# Patient Record
Sex: FEMALE | Race: ASIAN | Employment: OTHER | URBAN - METROPOLITAN AREA
[De-identification: names, ages, dates, MRNs, and addresses within clinical notes are randomized per-mention and may not be internally consistent; named-entity substitution may affect disease eponyms.]

---

## 2024-05-24 PROBLEM — H43.399 VITREOUS FLOATERS: Status: ACTIVE | Noted: 2024-05-24

## 2024-05-31 ENCOUNTER — HOSPITAL ENCOUNTER (OUTPATIENT)
Dept: ULTRASOUND IMAGING | Age: 79
Discharge: HOME | End: 2024-05-31
Attending: FAMILY MEDICINE
Payer: MEDICARE

## 2024-05-31 ENCOUNTER — HOSPITAL ENCOUNTER (OUTPATIENT)
Dept: WOMENS IMAGING | Age: 79
Discharge: HOME | End: 2024-05-31
Payer: MEDICARE

## 2024-05-31 VITALS — HEIGHT: 57 IN | BODY MASS INDEX: 29.99 KG/M2 | WEIGHT: 139 LBS

## 2024-05-31 DIAGNOSIS — Z12.31 VISIT FOR SCREENING MAMMOGRAM: ICD-10-CM

## 2024-05-31 DIAGNOSIS — E03.9 HYPOTHYROIDISM IN ADULT: ICD-10-CM

## 2024-05-31 DIAGNOSIS — R22.1 LUMP IN NECK: ICD-10-CM

## 2024-05-31 PROCEDURE — 77063 BREAST TOMOSYNTHESIS BI: CPT

## 2024-05-31 PROCEDURE — 76536 US EXAM OF HEAD AND NECK: CPT

## 2024-06-05 ENCOUNTER — APPOINTMENT (OUTPATIENT)
Dept: GENERAL RADIOLOGY | Age: 79
End: 2024-06-05
Payer: MEDICARE

## 2024-06-05 ENCOUNTER — APPOINTMENT (OUTPATIENT)
Dept: CT IMAGING | Age: 79
End: 2024-06-05
Payer: MEDICARE

## 2024-06-05 ENCOUNTER — TELEPHONE (OUTPATIENT)
Dept: CARDIOLOGY CLINIC | Age: 79
End: 2024-06-05

## 2024-06-05 ENCOUNTER — HOSPITAL ENCOUNTER (INPATIENT)
Age: 79
LOS: 2 days | Discharge: HOME | End: 2024-06-07
Attending: EMERGENCY MEDICINE | Admitting: INTERNAL MEDICINE
Payer: MEDICARE

## 2024-06-05 DIAGNOSIS — I20.9 ANGINA PECTORIS (HCC): ICD-10-CM

## 2024-06-05 DIAGNOSIS — R07.9 CHEST PAIN, UNSPECIFIED TYPE: Primary | ICD-10-CM

## 2024-06-05 DIAGNOSIS — R91.1 PULMONARY NODULE: ICD-10-CM

## 2024-06-05 PROBLEM — I16.0 HYPERTENSIVE URGENCY: Status: ACTIVE | Noted: 2024-06-05

## 2024-06-05 PROBLEM — E11.9 DM2 (DIABETES MELLITUS, TYPE 2) (HCC): Status: ACTIVE | Noted: 2024-06-05

## 2024-06-05 LAB
ALBUMIN SERPL-MCNC: 4.6 G/DL (ref 3.4–5)
ALBUMIN/GLOB SERPL: 1.5 {RATIO} (ref 1.1–2.2)
ALP SERPL-CCNC: 128 U/L (ref 40–129)
ALT SERPL-CCNC: 33 U/L (ref 10–40)
ANION GAP SERPL CALCULATED.3IONS-SCNC: 11 MMOL/L (ref 3–16)
AST SERPL-CCNC: 23 U/L (ref 15–37)
BASOPHILS # BLD: 0.1 K/UL (ref 0–0.2)
BASOPHILS NFR BLD: 1.2 %
BILIRUB SERPL-MCNC: 0.6 MG/DL (ref 0–1)
BUN SERPL-MCNC: 16 MG/DL (ref 7–20)
CALCIUM SERPL-MCNC: 9.4 MG/DL (ref 8.3–10.6)
CHLORIDE SERPL-SCNC: 101 MMOL/L (ref 99–110)
CO2 SERPL-SCNC: 26 MMOL/L (ref 21–32)
CREAT SERPL-MCNC: 0.8 MG/DL (ref 0.6–1.2)
D-DIMER QUANTITATIVE: 0.83 UG/ML FEU (ref 0–0.6)
DEPRECATED RDW RBC AUTO: 13.9 % (ref 12.4–15.4)
EOSINOPHIL # BLD: 0.1 K/UL (ref 0–0.6)
EOSINOPHIL NFR BLD: 2.3 %
GFR SERPLBLD CREATININE-BSD FMLA CKD-EPI: 76 ML/MIN/{1.73_M2}
GLUCOSE SERPL-MCNC: 93 MG/DL (ref 70–99)
HCT VFR BLD AUTO: 41.3 % (ref 36–48)
HGB BLD-MCNC: 14.2 G/DL (ref 12–16)
LYMPHOCYTES # BLD: 1.9 K/UL (ref 1–5.1)
LYMPHOCYTES NFR BLD: 39 %
MCH RBC QN AUTO: 31.9 PG (ref 26–34)
MCHC RBC AUTO-ENTMCNC: 34.4 G/DL (ref 31–36)
MCV RBC AUTO: 92.8 FL (ref 80–100)
MONOCYTES # BLD: 0.5 K/UL (ref 0–1.3)
MONOCYTES NFR BLD: 10.3 %
NEUTROPHILS # BLD: 2.3 K/UL (ref 1.7–7.7)
NEUTROPHILS NFR BLD: 47.2 %
NT-PROBNP SERPL-MCNC: 97 PG/ML (ref 0–449)
PLATELET # BLD AUTO: 184 K/UL (ref 135–450)
PMV BLD AUTO: 8.8 FL (ref 5–10.5)
POTASSIUM SERPL-SCNC: 3.9 MMOL/L (ref 3.5–5.1)
PROT SERPL-MCNC: 7.6 G/DL (ref 6.4–8.2)
RBC # BLD AUTO: 4.45 M/UL (ref 4–5.2)
SODIUM SERPL-SCNC: 138 MMOL/L (ref 136–145)
TROPONIN, HIGH SENSITIVITY: <6 NG/L (ref 0–14)
TROPONIN, HIGH SENSITIVITY: <6 NG/L (ref 0–14)
WBC # BLD AUTO: 4.8 K/UL (ref 4–11)

## 2024-06-05 PROCEDURE — 83880 ASSAY OF NATRIURETIC PEPTIDE: CPT

## 2024-06-05 PROCEDURE — 6370000000 HC RX 637 (ALT 250 FOR IP): Performed by: NURSE PRACTITIONER

## 2024-06-05 PROCEDURE — 6360000004 HC RX CONTRAST MEDICATION: Performed by: EMERGENCY MEDICINE

## 2024-06-05 PROCEDURE — 71250 CT THORAX DX C-: CPT

## 2024-06-05 PROCEDURE — 70450 CT HEAD/BRAIN W/O DYE: CPT

## 2024-06-05 PROCEDURE — 2580000003 HC RX 258: Performed by: INTERNAL MEDICINE

## 2024-06-05 PROCEDURE — 99285 EMERGENCY DEPT VISIT HI MDM: CPT

## 2024-06-05 PROCEDURE — 1200000000 HC SEMI PRIVATE

## 2024-06-05 PROCEDURE — 80053 COMPREHEN METABOLIC PANEL: CPT

## 2024-06-05 PROCEDURE — 36415 COLL VENOUS BLD VENIPUNCTURE: CPT

## 2024-06-05 PROCEDURE — 84484 ASSAY OF TROPONIN QUANT: CPT

## 2024-06-05 PROCEDURE — 85025 COMPLETE CBC W/AUTO DIFF WBC: CPT

## 2024-06-05 PROCEDURE — 85379 FIBRIN DEGRADATION QUANT: CPT

## 2024-06-05 PROCEDURE — 71045 X-RAY EXAM CHEST 1 VIEW: CPT

## 2024-06-05 PROCEDURE — 6360000002 HC RX W HCPCS: Performed by: INTERNAL MEDICINE

## 2024-06-05 PROCEDURE — 93005 ELECTROCARDIOGRAM TRACING: CPT | Performed by: EMERGENCY MEDICINE

## 2024-06-05 PROCEDURE — 6370000000 HC RX 637 (ALT 250 FOR IP): Performed by: INTERNAL MEDICINE

## 2024-06-05 PROCEDURE — 71275 CT ANGIOGRAPHY CHEST: CPT

## 2024-06-05 RX ORDER — SODIUM CHLORIDE 9 MG/ML
INJECTION, SOLUTION INTRAVENOUS PRN
Status: DISCONTINUED | OUTPATIENT
Start: 2024-06-05 | End: 2024-06-07 | Stop reason: HOSPADM

## 2024-06-05 RX ORDER — LEVOTHYROXINE SODIUM 0.07 MG/1
75 TABLET ORAL
Status: DISCONTINUED | OUTPATIENT
Start: 2024-06-06 | End: 2024-06-07 | Stop reason: HOSPADM

## 2024-06-05 RX ORDER — SODIUM CHLORIDE 0.9 % (FLUSH) 0.9 %
10 SYRINGE (ML) INJECTION PRN
Status: DISCONTINUED | OUTPATIENT
Start: 2024-06-05 | End: 2024-06-07 | Stop reason: HOSPADM

## 2024-06-05 RX ORDER — SODIUM CHLORIDE 0.9 % (FLUSH) 0.9 %
5-40 SYRINGE (ML) INJECTION EVERY 12 HOURS SCHEDULED
Status: DISCONTINUED | OUTPATIENT
Start: 2024-06-05 | End: 2024-06-07 | Stop reason: HOSPADM

## 2024-06-05 RX ORDER — HYDRALAZINE HYDROCHLORIDE 20 MG/ML
10 INJECTION INTRAMUSCULAR; INTRAVENOUS EVERY 6 HOURS PRN
Status: DISCONTINUED | OUTPATIENT
Start: 2024-06-05 | End: 2024-06-07 | Stop reason: HOSPADM

## 2024-06-05 RX ORDER — ONDANSETRON 2 MG/ML
4 INJECTION INTRAMUSCULAR; INTRAVENOUS EVERY 6 HOURS PRN
Status: DISCONTINUED | OUTPATIENT
Start: 2024-06-05 | End: 2024-06-07 | Stop reason: HOSPADM

## 2024-06-05 RX ORDER — NITROGLYCERIN 0.4 MG/1
0.4 TABLET SUBLINGUAL ONCE
Status: COMPLETED | OUTPATIENT
Start: 2024-06-05 | End: 2024-06-05

## 2024-06-05 RX ORDER — ATORVASTATIN CALCIUM 40 MG/1
40 TABLET, FILM COATED ORAL NIGHTLY
Status: DISCONTINUED | OUTPATIENT
Start: 2024-06-05 | End: 2024-06-05 | Stop reason: SDUPTHER

## 2024-06-05 RX ORDER — LOSARTAN POTASSIUM 100 MG/1
50 TABLET ORAL EVERY MORNING
Status: DISCONTINUED | OUTPATIENT
Start: 2024-06-06 | End: 2024-06-05

## 2024-06-05 RX ORDER — NITROGLYCERIN 0.4 MG/1
0.4 TABLET SUBLINGUAL EVERY 5 MIN PRN
Status: DISCONTINUED | OUTPATIENT
Start: 2024-06-05 | End: 2024-06-07 | Stop reason: HOSPADM

## 2024-06-05 RX ORDER — POTASSIUM CHLORIDE 7.45 MG/ML
10 INJECTION INTRAVENOUS PRN
Status: DISCONTINUED | OUTPATIENT
Start: 2024-06-05 | End: 2024-06-07 | Stop reason: HOSPADM

## 2024-06-05 RX ORDER — ATORVASTATIN CALCIUM 10 MG/1
10 TABLET, FILM COATED ORAL NIGHTLY
Status: DISCONTINUED | OUTPATIENT
Start: 2024-06-05 | End: 2024-06-07 | Stop reason: HOSPADM

## 2024-06-05 RX ORDER — METOPROLOL SUCCINATE 50 MG/1
100 TABLET, EXTENDED RELEASE ORAL DAILY
Status: DISCONTINUED | OUTPATIENT
Start: 2024-06-06 | End: 2024-06-05

## 2024-06-05 RX ORDER — HYDROCODONE BITARTRATE AND ACETAMINOPHEN 7.5; 325 MG/1; MG/1
1 TABLET ORAL EVERY 6 HOURS PRN
Status: DISCONTINUED | OUTPATIENT
Start: 2024-06-05 | End: 2024-06-07 | Stop reason: HOSPADM

## 2024-06-05 RX ORDER — METOPROLOL SUCCINATE 50 MG/1
100 TABLET, EXTENDED RELEASE ORAL EVERY EVENING
Status: DISCONTINUED | OUTPATIENT
Start: 2024-06-06 | End: 2024-06-07 | Stop reason: HOSPADM

## 2024-06-05 RX ORDER — ESTRADIOL 0.1 MG/G
2 CREAM VAGINAL NIGHTLY
Status: DISCONTINUED | OUTPATIENT
Start: 2024-06-05 | End: 2024-06-05

## 2024-06-05 RX ORDER — POTASSIUM CHLORIDE 20 MEQ/1
40 TABLET, EXTENDED RELEASE ORAL PRN
Status: DISCONTINUED | OUTPATIENT
Start: 2024-06-05 | End: 2024-06-05 | Stop reason: SDUPTHER

## 2024-06-05 RX ORDER — PANTOPRAZOLE SODIUM 40 MG/1
40 TABLET, DELAYED RELEASE ORAL DAILY
Status: DISCONTINUED | OUTPATIENT
Start: 2024-06-06 | End: 2024-06-07 | Stop reason: HOSPADM

## 2024-06-05 RX ORDER — POTASSIUM CHLORIDE 20 MEQ/1
40 TABLET, EXTENDED RELEASE ORAL PRN
Status: DISCONTINUED | OUTPATIENT
Start: 2024-06-05 | End: 2024-06-07 | Stop reason: HOSPADM

## 2024-06-05 RX ORDER — POTASSIUM CHLORIDE 7.45 MG/ML
10 INJECTION INTRAVENOUS PRN
Status: DISCONTINUED | OUTPATIENT
Start: 2024-06-05 | End: 2024-06-05 | Stop reason: SDUPTHER

## 2024-06-05 RX ORDER — ONDANSETRON 4 MG/1
4 TABLET, ORALLY DISINTEGRATING ORAL EVERY 8 HOURS PRN
Status: DISCONTINUED | OUTPATIENT
Start: 2024-06-05 | End: 2024-06-07 | Stop reason: HOSPADM

## 2024-06-05 RX ORDER — 0.9 % SODIUM CHLORIDE 0.9 %
500 INTRAVENOUS SOLUTION INTRAVENOUS PRN
Status: DISCONTINUED | OUTPATIENT
Start: 2024-06-05 | End: 2024-06-07 | Stop reason: HOSPADM

## 2024-06-05 RX ORDER — ACETAMINOPHEN 650 MG/1
650 SUPPOSITORY RECTAL EVERY 6 HOURS PRN
Status: DISCONTINUED | OUTPATIENT
Start: 2024-06-05 | End: 2024-06-07 | Stop reason: HOSPADM

## 2024-06-05 RX ORDER — ASPIRIN 81 MG/1
324 TABLET, CHEWABLE ORAL ONCE
Status: COMPLETED | OUTPATIENT
Start: 2024-06-05 | End: 2024-06-05

## 2024-06-05 RX ORDER — METFORMIN HYDROCHLORIDE 500 MG/1
500 TABLET, EXTENDED RELEASE ORAL
Status: DISCONTINUED | OUTPATIENT
Start: 2024-06-06 | End: 2024-06-07 | Stop reason: HOSPADM

## 2024-06-05 RX ORDER — ASPIRIN 81 MG/1
81 TABLET, CHEWABLE ORAL DAILY
Status: DISCONTINUED | OUTPATIENT
Start: 2024-06-06 | End: 2024-06-06

## 2024-06-05 RX ORDER — LOSARTAN POTASSIUM 100 MG/1
50 TABLET ORAL NIGHTLY
Status: DISCONTINUED | OUTPATIENT
Start: 2024-06-06 | End: 2024-06-06

## 2024-06-05 RX ORDER — ACETAMINOPHEN 325 MG/1
650 TABLET ORAL EVERY 6 HOURS PRN
Status: DISCONTINUED | OUTPATIENT
Start: 2024-06-05 | End: 2024-06-07 | Stop reason: HOSPADM

## 2024-06-05 RX ADMIN — ASPIRIN 324 MG: 81 TABLET, CHEWABLE ORAL at 21:37

## 2024-06-05 RX ADMIN — METOPROLOL SUCCINATE 100 MG: 50 TABLET, EXTENDED RELEASE ORAL at 23:49

## 2024-06-05 RX ADMIN — HYDROCODONE BITARTRATE AND ACETAMINOPHEN 1 TABLET: 7.5; 325 TABLET ORAL at 23:17

## 2024-06-05 RX ADMIN — NITROGLYCERIN 0.4 MG: 0.4 TABLET SUBLINGUAL at 21:37

## 2024-06-05 RX ADMIN — SODIUM CHLORIDE, PRESERVATIVE FREE 10 ML: 5 INJECTION INTRAVENOUS at 23:49

## 2024-06-05 RX ADMIN — IOPAMIDOL 75 ML: 755 INJECTION, SOLUTION INTRAVENOUS at 19:15

## 2024-06-05 RX ADMIN — LOSARTAN POTASSIUM 50 MG: 100 TABLET, FILM COATED ORAL at 23:49

## 2024-06-05 RX ADMIN — ATORVASTATIN CALCIUM 10 MG: 10 TABLET, FILM COATED ORAL at 23:49

## 2024-06-05 RX ADMIN — HYDRALAZINE HYDROCHLORIDE 10 MG: 20 INJECTION INTRAMUSCULAR; INTRAVENOUS at 23:17

## 2024-06-05 ASSESSMENT — ENCOUNTER SYMPTOMS
SHORTNESS OF BREATH: 0
ABDOMINAL PAIN: 0
BACK PAIN: 1
VOMITING: 0
DIARRHEA: 0
CHEST TIGHTNESS: 0
NAUSEA: 0

## 2024-06-05 ASSESSMENT — LIFESTYLE VARIABLES
HOW MANY STANDARD DRINKS CONTAINING ALCOHOL DO YOU HAVE ON A TYPICAL DAY: PATIENT DOES NOT DRINK
HOW OFTEN DO YOU HAVE A DRINK CONTAINING ALCOHOL: NEVER

## 2024-06-05 ASSESSMENT — PAIN DESCRIPTION - DESCRIPTORS: DESCRIPTORS: ACHING;THROBBING

## 2024-06-05 ASSESSMENT — PAIN SCALES - GENERAL
PAINLEVEL_OUTOF10: 8
PAINLEVEL_OUTOF10: 5

## 2024-06-05 ASSESSMENT — PAIN DESCRIPTION - PAIN TYPE: TYPE: ACUTE PAIN

## 2024-06-05 ASSESSMENT — PAIN DESCRIPTION - FREQUENCY: FREQUENCY: CONTINUOUS

## 2024-06-05 ASSESSMENT — PAIN DESCRIPTION - ONSET: ONSET: ON-GOING

## 2024-06-05 ASSESSMENT — PAIN DESCRIPTION - ORIENTATION: ORIENTATION: MID;LEFT;RIGHT

## 2024-06-05 ASSESSMENT — HEART SCORE: ECG: NORMAL

## 2024-06-05 ASSESSMENT — PAIN DESCRIPTION - LOCATION: LOCATION: HEAD

## 2024-06-05 NOTE — ED PROVIDER NOTES
3    Associated Diagnoses: Essential hypertension, benign      triamcinolone (KENALOG) 0.1 % cream Apply topically 2 times daily.  Qty: 15 g, Refills: 0    Associated Diagnoses: Seborrheic keratoses, inflamed      metFORMIN (GLUCOPHAGE-XR) 500 MG extended release tablet Take 1 tablet by mouth daily (with breakfast)  Qty: 90 tablet, Refills: 3    Associated Diagnoses: Diabetes mellitus, new onset (HCC)      apixaban (ELIQUIS) 5 MG TABS tablet Take 1 tablet by mouth 2 times daily  Qty: 28 tablet, Refills: 0    Comments: 2 boxes provided   Lot: [id number]  Exp:08/25 5/8/24 AD  Associated Diagnoses: Atrial fibrillation, unspecified type (HCC)      atorvastatin (LIPITOR) 10 MG tablet Take 1 tablet by mouth nightly  Qty: 90 tablet, Refills: 3    Associated Diagnoses: Mixed hyperlipidemia      levothyroxine (SYNTHROID) 75 MCG tablet Take 1 tablet by mouth daily  Qty: 90 tablet, Refills: 3    Associated Diagnoses: Hypothyroidism in adult      pantoprazole (PROTONIX) 40 MG tablet Take 1 tablet by mouth daily  Qty: 90 tablet, Refills: 3    Associated Diagnoses: GERD without esophagitis      metoprolol succinate (TOPROL XL) 100 MG extended release tablet Take 1 tablet by mouth daily  Qty: 90 tablet, Refills: 3      nystatin (MYCOSTATIN) 142781 UNIT/GM ointment Apply topically 2 times daily.  Qty: 30 g, Refills: 0    Associated Diagnoses: Yeast infection of the vagina      estradiol (ESTRACE) 0.1 MG/GM vaginal cream              ALLERGIES     Macrobid [nitrofurantoin] and Penicillins    FAMILYHISTORY       Family History   Problem Relation Age of Onset    High Blood Pressure Father         SOCIAL HISTORY       Social History     Tobacco Use    Smoking status: Never    Smokeless tobacco: Never    Tobacco comments:     no history   Vaping Use    Vaping Use: Never used   Substance Use Topics    Alcohol use: No    Drug use: No       SCREENINGS        Suzie Coma Scale  Eye Opening: Spontaneous  Best Verbal Response: Oriented  Best  release tablet 100 mg (100 mg Oral Given 6/5/24 2349)   iopamidol (ISOVUE-370) 76 % injection 75 mL (75 mLs IntraVENous Given 6/5/24 1915)   aspirin chewable tablet 324 mg (324 mg Oral Given 6/5/24 2137)   nitroGLYCERIN (NITROSTAT) SL tablet 0.4 mg (0.4 mg SubLINGual Given 6/5/24 2137)             Is this patient to be included in the SEP-1 Core Measure due to severe sepsis or septic shock?   No   Exclusion criteria - the patient is NOT to be included for SEP-1 Core Measure due to:  Infection is not suspected    CONSULTS: (Who and What was discussed)  IP CONSULT TO SPIRITUAL SERVICES  Discussion with Other Profesionals : Admitting Team kina    Social Determinants : None    Records Reviewed : Outpatient Notes Dr. Reyes as well as Dr. BURNETT's notes from today alerting patient that she should be seen in the emergency department with concerning symptoms of hypertensive urgency, chest pain, and headache.    CC/HPI Summary, DDx, ED Course, and Reassessment:     Briefly, this is a very pleasant 78-year-old female accompanied by her  who presents to the emergency department with elevated blood pressure, chest discomfort, and headache since last night.  Reports that blood pressure was as high as 222 systolic.  She is taken her losartan this morning but has not yet had her evening dose.  She did talk with Dr. Reyes, primary care, as well as Dr. Gómez, cardiology and advised patient to be seen in the emergency department.  Her physicians did agree that the patient should be evaluated in the emergency department with concerning symptoms of chest pain, headache, and hypertensive urgency.    Troponin x 2 negative.  CBC is unremarkable.  CMP unremarkable.  BNP 97.  D-dimer 0.83.    CT CHEST WO CONTRAST (Final result)  Result time 06/05/24 20:43:28  Final result by Zaheer Schumacher MD (06/05/24 20:43:28)                Impression:    7 mm nonspecific nodule in the posterior segment of the left lung upper lobe.  A follow-up in 6

## 2024-06-05 NOTE — ED PROVIDER NOTES
I have personally performed a face to face diagnostic evaluation on this patient. I have fully participated in the care of this patient I personally saw the patient and performed a substantive portion of the visit including all aspects of the medical decision making.  I have reviewed and agree with all pertinent clinical information including history, physical exam, diagnostic tests, and the plan.      HPI: Tressa Garcia presented with hyper tension, headache, chest pain.  Patient took her blood pressure last night today and her blood pressure was 222 systolic.  She has taken her blood pressure medication this morning but not yet her evening dose.  She spoke with Dr. Fields as well as her cardiologist who advised her to come into the ER.  They were concerned for potential hypertensive urgency  Chief Complaint   Patient presents with    Hypertension     Pt states that she started having HTN since last night. Pt states that her BP last night was 219, today was 222 systolic. Pt staets that her PCP told her to double her losartan and take 1 50 mg in morning 1 at night.       Review of Systems: See ELIJAH note  Vital Signs: BP (!) 207/78   Pulse 76   Temp 98.3 °F (36.8 °C)   Resp 16   Ht 1.448 m (4' 9\")   Wt 67.1 kg (148 lb)   SpO2 98%   BMI 32.03 kg/m²     Alert 78 y.o. female who does not appear toxic or acutely ill  HENT: Atraumatic, oral mucosa moist  Neck: Grossly normal ROM  Chest/Lungs: respiratory effort normal   Abdomen: Soft nontender  Musculoskeletal: Grossly normal ROM  Skin: No palor or diaphoresis    Medical Decision Making and Plan:  Pertinent Labs & Imaging studies reviewed. (See ELIJAH chart for details)  I agree with ELIJAH assessment and plan.  78-year-old female presents for chest pain, severe hypertension patient's blood pressure was greater than 200 upon arrival.  We obtained cardiac workup.  Troponins are negative CBC is unremarkable BNP is normal D-dimer is mildly elevated CT PE is negative

## 2024-06-05 NOTE — TELEPHONE ENCOUNTER
Pt is having dizziness, sob, chest pain and she recorded her bp since yesterday 10 pm 219/101, 11:35 pm 229/108 p 72, she stated that she called her pcp at 11:35pm and  advised her to take another 50 mg of losartan, 3:448 am 178/90 p 69,  4:44am 185/86 p 66. Advised to go to ER. Pt verbalized that she will wait for her  to go to ER.

## 2024-06-05 NOTE — TELEPHONE ENCOUNTER
Pt states PCP Dr Reyes is wanting pt to see [facility] due to pt's BP is staying elevated since last week. 169/80,219/101, 229/108, 185/86, 183/95 pulse 74, 163/80  pulse 70, 179/90 pulse 80. Pt states she is lightheaded, tired, Headache and some chest pressure. Please call to advise.

## 2024-06-05 NOTE — TELEPHONE ENCOUNTER
Called and lmom for the patient to call back. With Symptoms like that she needs to go to the ER per [facility].

## 2024-06-06 ENCOUNTER — APPOINTMENT (OUTPATIENT)
Dept: MRI IMAGING | Age: 79
End: 2024-06-06
Payer: MEDICARE

## 2024-06-06 ENCOUNTER — APPOINTMENT (OUTPATIENT)
Age: 79
End: 2024-06-06
Attending: INTERNAL MEDICINE
Payer: MEDICARE

## 2024-06-06 ENCOUNTER — HOSPITAL ENCOUNTER (INPATIENT)
Age: 79
Discharge: HOME | End: 2024-06-08
Attending: INTERNAL MEDICINE
Payer: MEDICARE

## 2024-06-06 VITALS
WEIGHT: 142 LBS | DIASTOLIC BLOOD PRESSURE: 85 MMHG | HEIGHT: 59 IN | SYSTOLIC BLOOD PRESSURE: 165 MMHG | HEART RATE: 61 BPM | BODY MASS INDEX: 28.63 KG/M2

## 2024-06-06 LAB
ANION GAP SERPL CALCULATED.3IONS-SCNC: 10 MMOL/L (ref 3–16)
BASOPHILS # BLD: 0.1 K/UL (ref 0–0.2)
BASOPHILS NFR BLD: 1.2 %
BUN SERPL-MCNC: 16 MG/DL (ref 7–20)
CALCIUM SERPL-MCNC: 9.4 MG/DL (ref 8.3–10.6)
CHLORIDE SERPL-SCNC: 103 MMOL/L (ref 99–110)
CHOLEST SERPL-MCNC: 134 MG/DL (ref 0–199)
CO2 SERPL-SCNC: 27 MMOL/L (ref 21–32)
CREAT SERPL-MCNC: 0.6 MG/DL (ref 0.6–1.2)
D-DIMER QUANTITATIVE: 0.86 UG/ML FEU (ref 0–0.6)
DEPRECATED RDW RBC AUTO: 14 % (ref 12.4–15.4)
ECHO BSA: 1.64 M2
EOSINOPHIL # BLD: 0.1 K/UL (ref 0–0.6)
EOSINOPHIL NFR BLD: 1.1 %
GFR SERPLBLD CREATININE-BSD FMLA CKD-EPI: >90 ML/MIN/{1.73_M2}
GLUCOSE SERPL-MCNC: 117 MG/DL (ref 70–99)
HCT VFR BLD AUTO: 39.7 % (ref 36–48)
HDLC SERPL-MCNC: 41 MG/DL (ref 40–60)
HGB BLD-MCNC: 13.8 G/DL (ref 12–16)
LDLC SERPL CALC-MCNC: 68 MG/DL
LYMPHOCYTES # BLD: 1.9 K/UL (ref 1–5.1)
LYMPHOCYTES NFR BLD: 32.3 %
MCH RBC QN AUTO: 32 PG (ref 26–34)
MCHC RBC AUTO-ENTMCNC: 34.9 G/DL (ref 31–36)
MCV RBC AUTO: 91.7 FL (ref 80–100)
MONOCYTES # BLD: 0.4 K/UL (ref 0–1.3)
MONOCYTES NFR BLD: 6.4 %
NEUTROPHILS # BLD: 3.5 K/UL (ref 1.7–7.7)
NEUTROPHILS NFR BLD: 59 %
NUC STRESS EJECTION FRACTION: 71 %
NUC STRESS LV EDV: 58 ML (ref 56–104)
NUC STRESS LV ESV: 17 ML (ref 19–49)
NUC STRESS LV MASS: 102 G
PLATELET # BLD AUTO: 188 K/UL (ref 135–450)
PMV BLD AUTO: 8.8 FL (ref 5–10.5)
POTASSIUM SERPL-SCNC: 4 MMOL/L (ref 3.5–5.1)
RBC # BLD AUTO: 4.33 M/UL (ref 4–5.2)
SODIUM SERPL-SCNC: 140 MMOL/L (ref 136–145)
STRESS BASELINE DIAS BP: 85 MMHG
STRESS BASELINE HR: 62 BPM
STRESS BASELINE SYS BP: 165 MMHG
STRESS ESTIMATED WORKLOAD: 1 METS
STRESS O2 SAT PEAK: 94 %
STRESS O2 SAT REST: 96 %
STRESS PEAK DIAS BP: 74 MMHG
STRESS PEAK SYS BP: 134 MMHG
STRESS PERCENT HR ACHIEVED: 61 %
STRESS POST PEAK HR: 88 BPM
STRESS RATE PRESSURE PRODUCT: ABNORMAL BPM*MMHG
STRESS TARGET HR: 144 BPM
TID: 0.92
TRIGL SERPL-MCNC: 127 MG/DL (ref 0–150)
TROPONIN, HIGH SENSITIVITY: 8 NG/L (ref 0–14)
VLDLC SERPL CALC-MCNC: 25 MG/DL
WBC # BLD AUTO: 5.9 K/UL (ref 4–11)

## 2024-06-06 PROCEDURE — 6370000000 HC RX 637 (ALT 250 FOR IP): Performed by: INTERNAL MEDICINE

## 2024-06-06 PROCEDURE — 70551 MRI BRAIN STEM W/O DYE: CPT

## 2024-06-06 PROCEDURE — 3430000000 HC RX DIAGNOSTIC RADIOPHARMACEUTICAL: Performed by: INTERNAL MEDICINE

## 2024-06-06 PROCEDURE — 6360000002 HC RX W HCPCS: Performed by: INTERNAL MEDICINE

## 2024-06-06 PROCEDURE — 93016 CV STRESS TEST SUPVJ ONLY: CPT | Performed by: INTERNAL MEDICINE

## 2024-06-06 PROCEDURE — 1200000000 HC SEMI PRIVATE

## 2024-06-06 PROCEDURE — 93017 CV STRESS TEST TRACING ONLY: CPT

## 2024-06-06 PROCEDURE — 80048 BASIC METABOLIC PNL TOTAL CA: CPT

## 2024-06-06 PROCEDURE — 85025 COMPLETE CBC W/AUTO DIFF WBC: CPT

## 2024-06-06 PROCEDURE — 2580000003 HC RX 258: Performed by: INTERNAL MEDICINE

## 2024-06-06 PROCEDURE — 80061 LIPID PANEL: CPT

## 2024-06-06 PROCEDURE — 78452 HT MUSCLE IMAGE SPECT MULT: CPT

## 2024-06-06 PROCEDURE — 93018 CV STRESS TEST I&R ONLY: CPT | Performed by: INTERNAL MEDICINE

## 2024-06-06 PROCEDURE — A9502 TC99M TETROFOSMIN: HCPCS | Performed by: INTERNAL MEDICINE

## 2024-06-06 PROCEDURE — 99223 1ST HOSP IP/OBS HIGH 75: CPT | Performed by: INTERNAL MEDICINE

## 2024-06-06 PROCEDURE — 36415 COLL VENOUS BLD VENIPUNCTURE: CPT

## 2024-06-06 PROCEDURE — 78452 HT MUSCLE IMAGE SPECT MULT: CPT | Performed by: INTERNAL MEDICINE

## 2024-06-06 RX ORDER — LOSARTAN POTASSIUM 100 MG/1
50 TABLET ORAL ONCE
Status: COMPLETED | OUTPATIENT
Start: 2024-06-06 | End: 2024-06-06

## 2024-06-06 RX ORDER — REGADENOSON 0.08 MG/ML
0.4 INJECTION, SOLUTION INTRAVENOUS
Status: COMPLETED | OUTPATIENT
Start: 2024-06-06 | End: 2024-06-06

## 2024-06-06 RX ORDER — AMINOPHYLLINE 25 MG/ML
100 INJECTION, SOLUTION INTRAVENOUS ONCE
Status: COMPLETED | OUTPATIENT
Start: 2024-06-06 | End: 2024-06-06

## 2024-06-06 RX ORDER — MECLIZINE HCL 12.5 MG/1
12.5 TABLET ORAL 3 TIMES DAILY PRN
Status: DISCONTINUED | OUTPATIENT
Start: 2024-06-06 | End: 2024-06-07 | Stop reason: HOSPADM

## 2024-06-06 RX ORDER — LOSARTAN POTASSIUM 100 MG/1
100 TABLET ORAL NIGHTLY
Status: DISCONTINUED | OUTPATIENT
Start: 2024-06-06 | End: 2024-06-07

## 2024-06-06 RX ADMIN — ATORVASTATIN CALCIUM 10 MG: 10 TABLET, FILM COATED ORAL at 21:46

## 2024-06-06 RX ADMIN — TETROFOSMIN 10.5 MILLICURIE: 1.38 INJECTION, POWDER, LYOPHILIZED, FOR SOLUTION INTRAVENOUS at 13:45

## 2024-06-06 RX ADMIN — SODIUM CHLORIDE, PRESERVATIVE FREE 10 ML: 5 INJECTION INTRAVENOUS at 09:34

## 2024-06-06 RX ADMIN — TETROFOSMIN 31.8 MILLICURIE: 1.38 INJECTION, POWDER, LYOPHILIZED, FOR SOLUTION INTRAVENOUS at 14:35

## 2024-06-06 RX ADMIN — REGADENOSON 0.4 MG: 0.08 INJECTION, SOLUTION INTRAVENOUS at 14:34

## 2024-06-06 RX ADMIN — AMINOPHYLLINE 100 MG: 25 INJECTION, SOLUTION INTRAVENOUS at 14:45

## 2024-06-06 RX ADMIN — SODIUM CHLORIDE, PRESERVATIVE FREE 10 ML: 5 INJECTION INTRAVENOUS at 21:46

## 2024-06-06 RX ADMIN — LEVOTHYROXINE SODIUM 75 MCG: 0.07 TABLET ORAL at 06:26

## 2024-06-06 RX ADMIN — LOSARTAN POTASSIUM 50 MG: 100 TABLET, FILM COATED ORAL at 13:00

## 2024-06-06 RX ADMIN — PANTOPRAZOLE SODIUM 40 MG: 40 TABLET, DELAYED RELEASE ORAL at 09:34

## 2024-06-06 RX ADMIN — ASPIRIN 81 MG: 81 TABLET, CHEWABLE ORAL at 09:33

## 2024-06-06 ASSESSMENT — PAIN DESCRIPTION - LOCATION: LOCATION: THROAT

## 2024-06-06 ASSESSMENT — PAIN SCALES - GENERAL: PAINLEVEL_OUTOF10: 1

## 2024-06-06 ASSESSMENT — PAIN DESCRIPTION - DESCRIPTORS: DESCRIPTORS: SORE

## 2024-06-06 ASSESSMENT — PAIN DESCRIPTION - ONSET: ONSET: GRADUAL

## 2024-06-06 ASSESSMENT — PAIN DESCRIPTION - FREQUENCY: FREQUENCY: INTERMITTENT

## 2024-06-06 ASSESSMENT — PAIN DESCRIPTION - PAIN TYPE: TYPE: ACUTE PAIN

## 2024-06-06 NOTE — CARE COORDINATION
Discharge Planning Note:    Chart reviewed and it appears that patient has minimal needs for discharge at this time. Risk Score 7 %     Primary Care Physician is Reyes, Eleia J, MD   Primary insurance is [employer]    Please notify case management if any discharge needs are identified.      Case management will continue to follow progress and update discharge plan as needed.

## 2024-06-06 NOTE — CONSULTS
[facility]  Cardiology Note  [phone]      Chief Complaint   Patient presents with    Hypertension     Pt states that she started having HTN since last night. Pt states that her BP last night was 219, today was 222 systolic. Pt staets that her PCP told her to double her losartan and take 1 50 mg in morning 1 at night.         History of Present Illness:  Tressa Garcia is a 78 y.o. patient PMHx DM, HLD, HTN, PAT who presented to the hospital with complaints of high blood pressure    Patient developed a headache and chest pain the evening of 6/5 and took her BP finding it to be highly eleated. She called her PCP and her cardiologist Dr. Gómez and was advised to present to the ED for further eval.    Patient received 1x IV hydralazine 10 and was restarted on home meds with improvement in BP to 140-150 systolic.     Patient reports ongoing chest heaviness and intermittent palpitations but improved from presentation.       Past Medical History:   has a past medical history of Age-related osteoporosis without current pathological fracture, Age-related osteoporosis without current pathological fracture, Arthritis, Chronic back pain, Colitis, Diabetes mellitus (HCC), Enteritis, Exposure to hepatitis B, Fibromyalgia, Gallstones, GERD (gastroesophageal reflux disease), Heart murmur, History of chickenpox, Hyperlipidemia, Hypertension, Hypothyroidism, Meniere's disease, Osteoarthritis, Prediabetes, Seasonal allergies, Submuscular lipoma of chest, and Vertigo.    Surgical History:   has a past surgical history that includes Colon surgery (12/05/2003); Cholecystectomy; cyst removal; Hysterectomy, total abdominal; other surgical history (09/26/2016); Colonoscopy (12/05/2003); Colonoscopy (05/01/2015); Colonoscopy (05/31/2018); Cataract removal with implant (Left, 06/03/2014); Cataract removal with implant (Right, 06/23/2020); Upper gastrointestinal endoscopy (N/A, 12/03/2021); Colonoscopy (N/A, 12/03/2021);

## 2024-06-06 NOTE — PROGRESS NOTES
4 Eyes Skin Assessment     NAME:  Tressa Garcai  YOB: 1945  MEDICAL RECORD NUMBER:  [id number]    The patient is being assessed for  Admission    I agree that at least one RN has performed a thorough Head to Toe Skin Assessment on the patient. ALL assessment sites listed below have been assessed.      Areas assessed by both nurses:    Head, Face, Ears, Shoulders, Back, Chest, Arms, Elbows, Hands, Sacrum. Buttock, Coccyx, Ischium, Legs. Feet and Heels, Under Medical Devices , and Other          Does the Patient have a Wound? No noted wound(s)       Nagi Prevention initiated by RN: Yes  Wound Care Orders initiated by RN: No    Pressure Injury (Stage 3,4, Unstageable, DTI, NWPT, and Complex wounds) if present, place Wound referral order by RN under : No    New Ostomies, if present place, Ostomy referral order under : No     Nurse 1 eSignature: Electronically signed by Ofelia Adkins RN on 6/6/24 at 7:05 AM EDT    **SHARE this note so that the co-signing nurse can place an eSignature**    Nurse 2 eSignature: Electronically signed by Mirella Anguiano RN on 6/6/24 at 7:06 AM EDT

## 2024-06-06 NOTE — PLAN OF CARE
Problem: Discharge Planning  Goal: Discharge to home or other facility with appropriate resources  6/6/2024 1426 by Skylar Barnes, RN  Outcome: Progressing  6/6/2024 0205 by Ofelia Adkins, RN  [facility]  Flowsheets (Taken 6/6/2024 0046)  Discharge to home or other facility with appropriate resources: Identify barriers to discharge with patient and caregiver     Problem: ABCDS Injury Assessment  Goal: Absence of physical injury  6/6/2024 1426 by Skylar Barnes, RN  Outcome: Progressing  6/6/2024 0205 by Ofelia Adkins, RN  [facility]

## 2024-06-06 NOTE — PROGRESS NOTES
Patient arrives to unit with  (Filiberto) bedside. Patient c/o headache and some dizziness. /92, prn Hydralazine and Phelps given. Patient states she hasn't taken her evening Losartan and Metoprolol. MD notified, orders placed for evening medications. Admission assessment complete. All patient and families questions and needs addressed. Bed locked, in lowest position with alarm on. Bedside table and call light within reach. Plan of care ongoing

## 2024-06-06 NOTE — ED NOTES
How does patient ambulate?   []Low Fall Risk (ambulates by themselves without support)  [x]Stand by assist   []Contact Guard   []Front wheel walker  []Wheelchair   []Steady  []Bed bound  []History of Lower Extremity Amputation  []Unknown, did not assess in the emergency department   How does patient take pills?  [x]Whole with Water  []Crushed in applesauce  []Crushed in pudding  []Other  []Unknown no oral medications were given in the ED  Is patient alert?   [x]Alert  []Drowsy but responds to voice  []Doesn't respond to voice but responds to painful stimuli  []Unresponsive  Is patient oriented?   [x]To person  [x]To place  [x]To time  [x]To situation  []Confused  []Agitated  []Follows commands  If patient is disoriented or from a Skill Nursing Facility has family been notified of admission?   [x]Yes   []No  Patient belongings?   [x]Cell phone  [x]Wallet   []Dentures  [x]Clothing  Any specific patient or family belongings/needs/dynamics?   na  Miscellaneous comments/pending orders?  na    If there are any additional questions please reach out to the Emergency Department.

## 2024-06-06 NOTE — PROGRESS NOTES
Pt admitted for chest pain assoc with HTN urgency    Full h+p to follow    Active Hospital Problems    Diagnosis Date Noted    Hypertensive urgency [I16.0] 06/05/2024    Chest pain [R07.9] 06/05/2024    Hypothyroidism in adult [E03.9] 11/07/2012    Mixed hyperlipidemia [E78.2] 11/07/2012       Please use [employer] to contact me with any questions during the day.   The hospitalist service will provide cross-coverage for this patient from 7pm to 7am.    During those hours, contact the on-call hospitalist MD/ELIJAH for questions.

## 2024-06-06 NOTE — PROGRESS NOTES
Messaged Dr. Avendano regarding the patient's dose of 100 mg extended release Metoprolol with heart rate 56, bp 128/67.  Per Dr. Avendano, hold dose.

## 2024-06-06 NOTE — PROGRESS NOTES
Progress Note - Dr. Avendano - Internal Medicine  PCP: Reyes, Eleia J, MD [address] [facility] [address] [phone]    Hospital Day: 1  Code Status: Full Code  Current Diet: Diet NPO        CC: follow up on medical issues    Subjective:   Tressa Garcia is a 78 y.o. female.    Pt seen and examined  Chart reviewed since last visit, labs and imaging below      Doing ok  Less chest pain  Still dizzy      Review of Systems: (1 system for EPF, 2-9 for detailed, 10+ for comprehensive)  Constitutional: Negative for chills and fever.     HENT: Negative for dental problem, nosebleeds and rhinorrhea.      Eyes: Negative for photophobia and visual disturbance.     Respiratory: Negative for cough, chest tightness and shortness of breath.      Cardiovascular: positive for chest pain and negative for leg swelling.     Gastrointestinal: Negative for diarrhea, nausea and vomiting.     Endocrine: Negative for polydipsia and polyphagia.     Genitourinary: Negative for frequency, hematuria and urgency.     Musculoskeletal: Negative for back pain and myalgias.     Skin: Negative for rash.     Allergic/Immunologic: Negative for food allergies.     Neurological: positive for dizziness, negative for seizures, syncope and facial asymmetry.     Hematological: Negative for adenopathy.     Psychiatric/Behavioral: Negative for dysphoric mood. The patient is not nervous/anxious.        I have reviewed the patient's medical and social history in detail and updated the computerized patient record.  To recap: She  has a past medical history of Age-related osteoporosis without current pathological fracture, Age-related osteoporosis without current pathological fracture, Arthritis, Chronic back pain, Colitis, Diabetes mellitus (HCC), Enteritis, Exposure to hepatitis B, Fibromyalgia, Gallstones, GERD (gastroesophageal reflux disease), Heart murmur, History of chickenpox, Hyperlipidemia, Hypertension, Hypothyroidism, Meniere's  HISTORY: ORDERING SYSTEM PROVIDED HISTORY: Dissection TECHNOLOGIST PROVIDED HISTORY: If patient is on cardiac monitor and/or pulse ox, they may be taken off cardiac monitor and pulse ox, left on O2 if currently on. All monitors reattached when patient returns to room. Has a \"code stroke\" or \"stroke alert\" been called?->No Reason for exam:->Dissection Decision Support Exception - unselect if not a suspected or confirmed emergency medical condition->Emergency Medical Condition (MA) Reason for Exam: Dissection FINDINGS: BRAIN/VENTRICLES: There is no acute intracranial hemorrhage, mass effect or midline shift.  No abnormal extra-axial fluid collection.  The gray-white differentiation is maintained without evidence of an acute infarct.  There is no evidence of hydrocephalus.  There are nonspecific hypoattenuating foci in the subcortical and periventricular white matter that most likely represent chronic microangiopathic ischemic changes in a patient of this age. ORBITS: The visualized portion of the orbits demonstrate no acute abnormality. SINUSES: The visualized paranasal sinuses and mastoid air cells demonstrate no acute abnormality. SOFT TISSUES/SKULL:  No acute abnormality of the visualized skull or soft tissues.     No acute intracranial abnormality.     XR CHEST PORTABLE    Result Date: 6/5/2024  EXAMINATION: ONE XRAY VIEW OF THE CHEST 6/5/2024 6:59 pm COMPARISON: None. HISTORY: ORDERING SYSTEM PROVIDED HISTORY: SOB TECHNOLOGIST PROVIDED HISTORY: Reason for exam:->SOB Reason for Exam: sob FINDINGS: The lungs are without acute focal process.  There is no effusion or pneumothorax. The cardiomediastinal silhouette is without acute process. The osseous structures are without acute process.     No acute process.     [facility] JOE DIGITAL SCREEN BILATERAL    Result Date: 5/31/2024  EXAMINATION: SCREENING DIGITAL BILATERAL MAMMOGRAM WITH TOMOSYNTHESIS, 5/31/2024 TECHNIQUE: Screening mammography of the bilateral breasts was

## 2024-06-06 NOTE — H&P
History and Physical  Dr. Avendano  6/5/2024    PCP: Reyes, Eleia J, MD    Cc:   Chief Complaint   Patient presents with    Hypertension     Pt states that she started having HTN since last night. Pt states that her BP last night was 219, today was 222 systolic. Pt staets that her PCP told her to double her losartan and take 1 50 mg in morning 1 at night.        HPI:  Tressa Garcia is a 78 y.o. female who has a past medical history of Age-related osteoporosis without current pathological fracture, Age-related osteoporosis without current pathological fracture, Arthritis, Chronic back pain, Colitis, Diabetes mellitus (HCC), Enteritis, Exposure to hepatitis B, Fibromyalgia, Gallstones, GERD (gastroesophageal reflux disease), Heart murmur, History of chickenpox, Hyperlipidemia, Hypertension, Hypothyroidism, Meniere's disease, Osteoarthritis, Prediabetes, Seasonal allergies, Submuscular lipoma of chest, and Vertigo.     Patient presents with Hypertensive urgency.  HPI  (1-3 for expanded problem focused, ?4 for detailed/comprehensive)      78 y.o. female who presents to the emergency department with elevated blood pressure, chest discomfort, and headache since last night.  Reports that blood pressure was as high as 222 systolic.  She is taken her losartan this morning but has not yet had her evening dose.  She did talk with Dr. Reyes, primary care, as well as Dr. Gómez, cardiology and advised patient to be seen in the emergency department.  Her physicians did agree that the patient should be evaluated in the emergency department with concerning symptoms of chest pain, headache, and hypertensive urgency.     BP has improved with meds in ER  With improvement in HTN urgency, chest pain has also improved    To be admitted for further eval    Problem list of hospitalization thus far:  Active Hospital Problems    Diagnosis     Hypertensive urgency [I16.0]     Chest pain [R07.9]     Hypothyroidism in adult [E03.9]     Mixed

## 2024-06-06 NOTE — PLAN OF CARE
Problem: Discharge Planning  Goal: Discharge to home or other facility with appropriate resources  Outcome: HH/[facility] Progressing  Flowsheets (Taken 6/6/2024 0046)  Discharge to home or other facility with appropriate resources: Identify barriers to discharge with patient and caregiver     Problem: ABCDS Injury Assessment  Goal: Absence of physical injury  Outcome: HH/[facility]SPC Progressing     Problem: Safety - Adult  Goal: Free from fall injury  Outcome: HH/[facility]SPC Progressing     Problem: Pain  Goal: Verbalizes/displays adequate comfort level or baseline comfort level  Outcome: HH/[facility]SPC Progressing

## 2024-06-07 VITALS
HEART RATE: 79 BPM | DIASTOLIC BLOOD PRESSURE: 73 MMHG | WEIGHT: 142.02 LBS | BODY MASS INDEX: 30.64 KG/M2 | RESPIRATION RATE: 16 BRPM | TEMPERATURE: 97.7 F | SYSTOLIC BLOOD PRESSURE: 136 MMHG | HEIGHT: 57 IN | OXYGEN SATURATION: 97 %

## 2024-06-07 LAB
EKG ATRIAL RATE: 72 BPM
EKG DIAGNOSIS: NORMAL
EKG P AXIS: 31 DEGREES
EKG P-R INTERVAL: 134 MS
EKG Q-T INTERVAL: 390 MS
EKG QRS DURATION: 74 MS
EKG QTC CALCULATION (BAZETT): 427 MS
EKG R AXIS: 17 DEGREES
EKG T AXIS: 33 DEGREES
EKG VENTRICULAR RATE: 72 BPM

## 2024-06-07 PROCEDURE — 92610 EVALUATE SWALLOWING FUNCTION: CPT

## 2024-06-07 PROCEDURE — 97530 THERAPEUTIC ACTIVITIES: CPT

## 2024-06-07 PROCEDURE — 2580000003 HC RX 258: Performed by: INTERNAL MEDICINE

## 2024-06-07 PROCEDURE — 97161 PT EVAL LOW COMPLEX 20 MIN: CPT

## 2024-06-07 PROCEDURE — 6370000000 HC RX 637 (ALT 250 FOR IP): Performed by: INTERNAL MEDICINE

## 2024-06-07 PROCEDURE — 92526 ORAL FUNCTION THERAPY: CPT

## 2024-06-07 PROCEDURE — 97165 OT EVAL LOW COMPLEX 30 MIN: CPT

## 2024-06-07 PROCEDURE — 99233 SBSQ HOSP IP/OBS HIGH 50: CPT | Performed by: NURSE PRACTITIONER

## 2024-06-07 PROCEDURE — 6370000000 HC RX 637 (ALT 250 FOR IP): Performed by: NURSE PRACTITIONER

## 2024-06-07 PROCEDURE — 93010 ELECTROCARDIOGRAM REPORT: CPT | Performed by: INTERNAL MEDICINE

## 2024-06-07 RX ORDER — LOSARTAN POTASSIUM 100 MG/1
50 TABLET ORAL ONCE
Status: COMPLETED | OUTPATIENT
Start: 2024-06-07 | End: 2024-06-07

## 2024-06-07 RX ORDER — LOSARTAN POTASSIUM 100 MG/1
100 TABLET ORAL NIGHTLY
Qty: 30 TABLET | Refills: 3 | Status: SHIPPED | OUTPATIENT
Start: 2024-06-07

## 2024-06-07 RX ORDER — LOSARTAN POTASSIUM 100 MG/1
100 TABLET ORAL NIGHTLY
Status: DISCONTINUED | OUTPATIENT
Start: 2024-06-08 | End: 2024-06-07 | Stop reason: HOSPADM

## 2024-06-07 RX ADMIN — LEVOTHYROXINE SODIUM 75 MCG: 0.07 TABLET ORAL at 06:46

## 2024-06-07 RX ADMIN — PANTOPRAZOLE SODIUM 40 MG: 40 TABLET, DELAYED RELEASE ORAL at 08:33

## 2024-06-07 RX ADMIN — LOSARTAN POTASSIUM 50 MG: 100 TABLET, FILM COATED ORAL at 12:15

## 2024-06-07 RX ADMIN — METFORMIN HYDROCHLORIDE 500 MG: 500 TABLET, EXTENDED RELEASE ORAL at 08:33

## 2024-06-07 RX ADMIN — SODIUM CHLORIDE, PRESERVATIVE FREE 10 ML: 5 INJECTION INTRAVENOUS at 08:33

## 2024-06-07 RX ADMIN — LOSARTAN POTASSIUM 50 MG: 100 TABLET, FILM COATED ORAL at 09:50

## 2024-06-07 ASSESSMENT — PAIN SCALES - GENERAL: PAINLEVEL_OUTOF10: 0

## 2024-06-07 NOTE — PROGRESS NOTES
Shift assessment completed, see flowsheets.  Medications administered, see MAR. Vital signs stable. No complaints of pain or discomfort. Plan of care discussed with patient. Safety precautions in place. Call light and bedside table within reach.  Patient denies further needs at this time.  Will continue to monitor.  Skylar Holder RN

## 2024-06-07 NOTE — PROGRESS NOTES
.  [facility] - Inpatient Rehabilitation Department   Phone: [phone]    Physical Therapy    [] Initial Evaluation            [] Daily Treatment Note         [] Discharge Summary      Patient: Tressa Garcia   : 1945   MRN: [id number]   Date of Service:  2024  Admitting Diagnosis: Hypertensive urgency  Current Admission Summary: ***  Past Medical History:  has a past medical history of Age-related osteoporosis without current pathological fracture, Age-related osteoporosis without current pathological fracture, Arthritis, Chronic back pain, Colitis, Diabetes mellitus (HCC), Enteritis, Exposure to hepatitis B, Fibromyalgia, Gallstones, GERD (gastroesophageal reflux disease), Heart murmur, History of chickenpox, Hyperlipidemia, Hypertension, Hypothyroidism, Meniere's disease, Osteoarthritis, Prediabetes, Seasonal allergies, Submuscular lipoma of chest, and Vertigo.  Past Surgical History:  has a past surgical history that includes Colon surgery (2003); Cholecystectomy; cyst removal; Hysterectomy, total abdominal; other surgical history (2016); Colonoscopy (2003); Colonoscopy (2015); Colonoscopy (2018); Cataract removal with implant (Left, 2014); Cataract removal with implant (Right, 2020); Upper gastrointestinal endoscopy (N/A, 2021); Colonoscopy (N/A, 2021); eye surgery (3 years ago); and Cystoscopy (Right, 10/19/2022).  Discharge Recommendations: ***  DME Required For Discharge: DME to be determined pending patient progress  Precautions/Restrictions: {FFRESTRICTIONS:86600}  Weight Bearing Restrictions: {FFWBRESTRICTIONS:48445}  [] Right Upper Extremity  [] Left Upper Extremity [] Right Lower Extremity  [] Left Lower Extremity     Required Braces/Orthotics: {ffbraces:44994}   [] Right  [] Left  Positional Restrictions:{ffpositionrestrictions:73797}    Pre-Admission Information   Lives With: spouse, sister in law      Type of    {ffptcommandfollowin}    Education  Barriers To Learning: {ffptlearning barriers:63256}  Patient Education: patient educated on {ffpteducation:17750}  Learning Assessment:  {ffptlearnin}    Assessment  Activity Tolerance: ***  Impairments Requiring Therapeutic Intervention: {ffptimpairments:08207}  Prognosis: {ffptprognosis:61373}  Clinical Assessment: ***  Safety Interventions: {ffptsafety:28929}    Plan  Frequency: {ffptfrequency:19464}  Current Treatment Recommendations: {ffpttreatmentrecommendations:53229}    Goals  Patient Goals: ***   Short Term Goals:  Time Frame: ***  {ffptgoals:79555}    Above goals reviewed on 2024.  All goals are ongoing at this time unless indicated above.      Therapy Session Time      Individual Group Co-treatment   Time In         Time Out         Minutes           Timed Code Treatment Minutes:     Total Treatment Minutes:         Electronically Signed By: Shasha Disla, [facility]

## 2024-06-07 NOTE — PROGRESS NOTES
Transit:   []Suspected Premature Bolus Loss:   []Lingual/Palatal Residue:   []Other:     Pharyngeal Phase: [x]WFL []Mild   [] Moderate  []Severe  []To be assessed   []Delayed Swallow:   []Suspected Pharyngeal Pooling:   []Decreased Laryngeal Elevation:   []Absent Swallow:  []Wet Vocal Quality:   []Throat Clearing-Immediate:   []Throat Clearing-Delayed:   []Cough-Immediate:   []Cough-Delayed:  []Change in Vital Signs:  []Suspected Delayed Pharyngeal Clearing:  []Other:     Eating Assistance:  [x]Independent  []Setup or clean-up assistance   [] Supervision or touching assistance   [] Partial or moderate assistance   [] Substantial or maximal assistance  [] Dependent     EDUCATION:   Provided education regarding role of SLP, results of assessment, recommendations and general speech pathology plan of care.   [x] Pt verbalized understanding and agreement   [] Pt requires ongoing learning   [] No evidence of comprehension     Treatment time:  Timed Code Treatment Minutes: 0  Total Treatment time: 25    Electronically signed by:  Torrie Cali M.A., CCC-SLP [id number]  Speech-Language Pathologist

## 2024-06-07 NOTE — PLAN OF CARE
Problem: Discharge Planning  Goal: Discharge to home or other facility with appropriate resources  6/7/2024 0817 by Skylar Holder, RN  Outcome: Completed     Problem: ABCDS Injury Assessment  Goal: Absence of physical injury  6/7/2024 0817 by Skylar Holder, RN  Outcome: Completed     Problem: Safety - Adult  Goal: Free from fall injury  6/7/2024 0817 by Skylar Holder, RN  Outcome: Completed     Problem: Pain  Goal: Verbalizes/displays adequate comfort level or baseline comfort level  6/7/2024 0817 by Skylar Holder, RN  Outcome: Completed

## 2024-06-07 NOTE — PROGRESS NOTES
end systolic volume: 17 mL. LV mass: 102.0 g.    Perfusion Comments: LV perfusion is normal. There is no evidence of inducible ischemia.    ECG: Resting ECG demonstrates normal sinus rhythm.    ECG: The stress ECG was not diagnostic due to failure to achieve target heart rate and pharmacologic protocol.    Cardiac MRI: 6/6/24  CONCLUSIONS     Overall unremarkable study with normal LV and RV size and systolic function.   No evidence of HCM      -Mild LVH with normal LV systolic function with a calculated ejection fraction  of 69% by Fitzpatrick's method. Normal LV mass. Flow acceleration seen in the LVOT  without any other evidence of hypertrophic cardiomyopathy.   -Normal LV global longitudinal strain (GLS) -24  -Native myocardial T1 time is normal.  -Normal right ventricular size and systolic function with a calculated ejection  fraction of 76% by Fitzpatrick's method.  -Trivial mitral regurgitation.  -Dilated left atrium  -No myocardial edema by T2w imaging/mapping. (Normal myocardium/skeletal ratio -  normal < 1.9).  -No significant intracardiac shunt. Calculated Qp:Qs:0.89 (Phase contrast  velocity encoding Ao/Pa)  -Normal myocardial resting perfusion imaging.  -On delayed enhancement imaging, there is no abnormal hyperenhancement to  suggest myocardial scar/inflammation/infiltration.     The MRI sequences and imaging planes in this study were tailored for cardiac  imaging and are suboptimal for evaluation of non-cardiac structures.    Problem List:   Patient Active Problem List    Diagnosis Date Noted    Generalized osteoarthritis of multiple sites 06/23/2022    Spondylosis of lumbar spine 06/23/2022    Paresthesias 06/23/2022    Hypertensive urgency 06/05/2024    Chest pain 06/05/2024    DM2 (diabetes mellitus, type 2) ([facility]) 06/05/2024    Vitreous floaters 05/24/2024    Dizziness 03/11/2024    PAT (paroxysmal atrial tachycardia) ([facility]) 03/11/2024    SVT (supraventricular tachycardia) ([facility]) 02/27/2024    Calcification  of aorta (HCC) 04/28/2023    Hypertrophic cardiomyopathy (HCC) 04/28/2023    Rheumatoid factor positive 12/23/2021    Polyarthralgia 10/28/2021    Fibromyalgia 10/28/2021    Gastroesophageal reflux disease 10/21/2020    Age-related osteoporosis without current pathological fracture 07/01/2020    Chronic rhinitis 06/22/2016    Other pruritus 06/22/2016    Essential hypertension, benign 11/07/2012    Mixed hyperlipidemia 11/07/2012    Hypothyroidism in adult 11/07/2012        Assessment and Plan:     Chest pain  ~ HS trop negative   ~ MPI without ischemia   ~ no CP today     Hypertensive urgency   ~ elevated this AM. Received 1/2 dose of ARB and no BB yesterday. Received 50mg of losartan this AM. Give other 1/2 losartan now. Then resume 100mg nightly starting tomorrow evening.   ~ Goal 140/90 with outlfow tract obstruction     HLD- statin   DM- A1C 6.8  PAT    Ok to d/c if BP improves. F/u as OP with Dr. Gómez.     Multiple medical conditions with risk of decompensation.   All pertinent information and plan of care discussed with the physician.  All questions and concerns were addressed to the patient. Alternatives to my treatment were discussed. I have discussed the above stated plan with patient and the nurse. The patient verbalized understanding and agreed with the plan.    Thank you for allowing to us to participate in the care of Tressa Springon Rivazquez.    Total visit time > 55 minutes; > 50% spend counseling / coordinating care. I reviewed interval history, physical exam, review of data including labs, imaging, development and implementation of treatment plan and coordination of complex care. Counseled on risk factor modifications.     Maryann ISLAS-[facility]   Office: [phone]    NOTE:  This report was transcribed using voice recognition software.  Every effort was made to ensure accuracy; however, inadvertent computerized transcription errors may be present.

## 2024-06-07 NOTE — PROGRESS NOTES
[facility] - Inpatient Rehabilitation Department   Phone: [phone]    Occupational Therapy    [x] Initial Evaluation            [] Daily Treatment Note         [x] Discharge Summary      Patient: Tressa Garcia   : 1945   MRN: [id number]   Date of Service:  2024    Admitting Diagnosis:  Hypertensive urgency  Current Admission Summary: 78 y.o. female who presents to the emergency department with elevated blood pressure, chest discomfort, and headache since last night. Reports that blood pressure was as high as 222 systolic. She is taken her losartan this morning but has not yet had her evening dose. She did talk with Dr. Reyes, primary care, as well as Dr. Gómez, cardiology and advised patient to be seen in the emergency department. Her physicians did agree that the patient should be evaluated in the emergency department with concerning symptoms of chest pain, headache, and hypertensive urgency.   Past Medical History:  has a past medical history of Age-related osteoporosis without current pathological fracture, Age-related osteoporosis without current pathological fracture, Arthritis, Chronic back pain, Colitis, Diabetes mellitus (HCC), Enteritis, Exposure to hepatitis B, Fibromyalgia, Gallstones, GERD (gastroesophageal reflux disease), Heart murmur, History of chickenpox, Hyperlipidemia, Hypertension, Hypothyroidism, Meniere's disease, Osteoarthritis, Prediabetes, Seasonal allergies, Submuscular lipoma of chest, and Vertigo.  Past Surgical History:  has a past surgical history that includes Colon surgery (2003); Cholecystectomy; cyst removal; Hysterectomy, total abdominal; other surgical history (2016); Colonoscopy (2003); Colonoscopy (2015); Colonoscopy (2018); Cataract removal with implant (Left, 2014); Cataract removal with implant (Right, 2020); Upper gastrointestinal endoscopy (N/A, 2021); Colonoscopy (N/A, 2021); eye  and divergence impaired, slow tracking/eye movements.    Pt encouraged to go to eye doctor for full assessment/work up, she reports she has an appointment already scheduled.   Hearing:   WFL  Observation:   General Observation:  telemetry, pt on RA , due to history of fibromyalgia   Posture:   WFL  Sensation:   reports numbness and tingling in all extremities  ROM:   (B) UE ROM WFL  Strength:   (B) UE gross strength WFL    Therapist Clinical Decision Making (Complexity): low complexity  Clinical Presentation: stable      Subjective  General: Pt seated in chair upon entry, pleasant and agreeable to OT/PT evaluation. Pt reports she has been up ad tano. Reporting some intermittent dizziness with position changes since this admission.   Pain: 0/10  Pain Interventions: not applicable      Activities of Daily Living  Basic Activities of Daily Living  General Comments: Pt politely declined ADL completion at time of evaluation  Instrumental Activities of Daily Living  No IADL completed on this date.    Functional Mobility  Bed Mobility  Bed mobility not completed on this date.  Comments:  Transfers  Sit to stand transfer:Independent  Stand to sit transfer: Independent  Comments:  Functional Mobility  Functional Mobility Activity: short bouts of ambulation within room, up to ~90'  Device Use: no device  Required Assistance: Independent  Comment: no overt LOB, pt reports intermittent dizziness when turning head/changing directions.  Balance:  Static Sitting Balance: good: independent with functional balance in unsupported position  Dynamic Sitting Balance: good: independent with functional balance in unsupported position  Static Standing Balance: good: independent with functional balance in unsupported position  Dynamic Standing Balance: good: independent with functional balance in unsupported position  Comments:    Other Therapeutic Interventions    Functional Outcomes  AM-PAC Inpatient Daily Activity Raw Score:

## 2024-06-07 NOTE — FLOWSHEET NOTE
Discharge instructions and paperwork given to patient. Patient verbalized understanding and denied further questions. IV removed. All belongings sent with patient. Patient to lobby for pickup.

## 2024-06-07 NOTE — PROGRESS NOTES
[facility] - Inpatient Rehabilitation Department   Phone: [phone]    Physical Therapy    [x] Initial Evaluation            [] Daily Treatment Note         [x] Discharge Summary      Patient: Tressa Garcia   : 1945   MRN: [id number]   Date of Service:  2024  Admitting Diagnosis: Hypertensive urgency  Current Admission Summary: 78 y.o. female who presents to the emergency department with elevated blood pressure, chest discomfort, and headache since last night. Reports that blood pressure was as high as 222 systolic. She is taken her losartan this morning but has not yet had her evening dose. She did talk with Dr. Reyes, primary care, as well as Dr. Gómez, cardiology and advised patient to be seen in the emergency department. Her physicians did agree that the patient should be evaluated in the emergency department with concerning symptoms of chest pain, headache, and hypertensive urgency.   Past Medical History:  has a past medical history of Age-related osteoporosis without current pathological fracture, Age-related osteoporosis without current pathological fracture, Arthritis, Chronic back pain, Colitis, Diabetes mellitus (HCC), Enteritis, Exposure to hepatitis B, Fibromyalgia, Gallstones, GERD (gastroesophageal reflux disease), Heart murmur, History of chickenpox, Hyperlipidemia, Hypertension, Hypothyroidism, Meniere's disease, Osteoarthritis, Prediabetes, Seasonal allergies, Submuscular lipoma of chest, and Vertigo.  Past Surgical History:  has a past surgical history that includes Colon surgery (2003); Cholecystectomy; cyst removal; Hysterectomy, total abdominal; other surgical history (2016); Colonoscopy (2003); Colonoscopy (2015); Colonoscopy (2018); Cataract removal with implant (Left, 2014); Cataract removal with implant (Right, 2020); Upper gastrointestinal endoscopy (N/A, 2021); Colonoscopy (N/A, 2021); eye surgery (3  years ago); and Cystoscopy (Right, 10/19/2022).  Discharge Recommendations: Tressa Garcia scored a 24/24 on the AM-PAC short mobility form.  At this time, no further PT is recommended upon discharge due to patient at independent level.  Recommend patient returns to prior setting with prior services.    DME Required For Discharge: No new DME required  Precautions/Restrictions: low fall risk  Positional Restrictions:no positional restrictions    Pre-Admission Information   Lives With: spouse, sister-in-law     Type of Home: house  Home Layout: two level  Home Access:  3 step to enter without rails . Pt states 10-15 steps to basement with HR on L side when descending.   Bathroom Layout: walk in shower  Toilet Height: standard height  Bathroom Equipment:  NA  Home Equipment: no prior equipment  Transfer Assistance: Independent without use of device  Ambulation Assistance:Independent without use of device  ADL Assistance: independent with all ADL's  IADL Assistance: independent with homemaking tasks  Active :        [] Yes  [x] No  Hand Dominance: [] Left  [x] Right  Current Employment: retired.  Occupation: [employer]   Hobbies: cooking, gardening, reading   Recent Falls: Pt reports no falls in the past six months.     Examination   Vision:   Pt reports cataracts in B eyes. Pt states she does not wear glasses.   Pt reporting intermittent dizziness/seeing floaters since this admission-pt reports this comes and goes. Does happen during mobility/changing positions quickly.   Visual Assessment conduction: Pt demo impaired visual tracking in all visual planes, demo decreased smooth pursuits and noted jumpy eye movements when tracking horizontally/vertically. Accomodation impaired, saccades WFL with fixed target (impaired with moving target).   Minimal eye movement with convergence/divergence (B).      Pt encouraged to go to eye doctor for full assessment/work up, she reports she has an appointment already

## 2024-06-07 NOTE — PLAN OF CARE
Problem: Discharge Planning  Goal: Discharge to home or other facility with appropriate resources  6/7/2024 0012 by Ofelia Adkins RN  Outcome: /[facility] Progressing  6/6/2024 1426 by Sklyar Barnes RN  Outcome: Progressing     Problem: ABCDS Injury Assessment  Goal: Absence of physical injury  6/7/2024 0012 by Ofelia Adkins RN  Outcome: /[facility] Progressing  6/6/2024 1426 by Skylar Barnes RN  Outcome: Progressing     Problem: Safety - Adult  Goal: Free from fall injury  Outcome: /[facility] Progressing     Problem: Pain  Goal: Verbalizes/displays adequate comfort level or baseline comfort level  Outcome: HH/[facility] Progressing

## 2024-06-07 NOTE — PROGRESS NOTES
Assessment complete, VSSA no c/o of pain, discomfort, nausea or dizziness. Patient A&Ox4. Patient looking forward to discharge  in am. Pm Losartan held due to P 108/65 per  provider. All patient needs addressed. Bed in lowest position, call light within reach. Plan of care ongoing.

## 2024-06-07 NOTE — DISCHARGE SUMMARY
[facility] -- Physician Discharge Summary     Tressa Garcia  1945  MRN: [id number]    Admit Date: 6/5/2024  Discharge Date: No discharge date for patient encounter.    Attending MD: Rico Avendano MD  Discharging MD: Rico Avendano MD  PCP: Reyes, Eleia J, MD [address] [address] [phone]    Admission Diagnosis: Angina pectoris (HCC) [I20.9]  Pulmonary nodule [R91.1]  Hypertensive urgency [I16.0]  Chest pain, unspecified type [R07.9]  DISCHARGE DIAGNOSIS: same    Full Hospital Problem List:  Active Hospital Problems    Diagnosis Date Noted    Hypertensive urgency [I16.0] 06/05/2024    Chest pain [R07.9] 06/05/2024    DM2 (diabetes mellitus, type 2) (HCC) [E11.9] 06/05/2024    Hypothyroidism in adult [E03.9] 11/07/2012    Mixed hyperlipidemia [E78.2] 11/07/2012           Hospital Course:   78 y.o. female who presents to the emergency department with elevated blood pressure, chest discomfort, and headache since last night.  Reports that blood pressure was as high as 222 systolic.  She is taken her losartan this morning but has not yet had her evening dose.  She did talk with Dr. Reyes, primary care, as well as Dr. Gómez, cardiology and advised patient to be seen in the emergency department.  Her physicians did agree that the patient should be evaluated in the emergency department with concerning symptoms of chest pain, headache, and hypertensive urgency.      BP has improved with meds in ER  With improvement in HTN urgency, chest pain has also improved     To be admitted for further eval  Troponins are normal    Stress test done  Narrative:       Stress Combined Conclusion: Normal pharmacological myocardial perfusion  study. Findings suggest a low risk study. Sensitivity of testing for  detection of ischemia is reduced on betablocker.    Stress Function: Left ventricular function post-stress is normal.  Post-stress ejection fraction is 71%. The stress end

## 2024-06-08 DIAGNOSIS — I99.8 FLUCTUATING BLOOD PRESSURE: Primary | ICD-10-CM

## 2024-06-08 RX ORDER — HYDRALAZINE HYDROCHLORIDE 10 MG/1
10 TABLET, FILM COATED ORAL 3 TIMES DAILY
Qty: 90 TABLET | Refills: 3 | Status: SHIPPED | OUTPATIENT
Start: 2024-06-08 | End: 2025-06-08

## 2024-06-08 NOTE — PROGRESS NOTES
Patient called to inform me she was discharged from the hospital and all the cardiac and CNS workup were negative.  Patient blood pressure at the hospital was controlled.  Discharged with no new medications. When she got home it started to go up again with systolic blood pressure over 200.  Patient takes losartan 50 mg twice a day and Toprol- mg daily.  Patient responded to hydralazine at the hospital.  Will try hydralazine 10 mg 3 times a day as needed for systolic blood pressure over 150/100.  Prescription sent.  Advised patient to continue monitor blood pressure.  Patient was also complaining of sore throat and pulling sensation in her neck.  Advised to see Dr. Spain, ENT for evaluation.  Patient is already on PPI for GERD.  Had normal neck/thyroid ultrasound last month.

## 2024-06-10 ENCOUNTER — CARE COORDINATION (OUTPATIENT)
Dept: CASE MANAGEMENT | Age: 79
End: 2024-06-10

## 2024-06-10 NOTE — CARE COORDINATION
Care Transitions Note  Initial Call - Call within 2 business days of discharge: Yes    Attempted to reach patient for transitions of care follow up. Unable to reach patient.    Outreach Attempts:   Multiple attempts to contact patient at phone numbers on file.   HIPAA compliant voicemail left for patient.     Patient: Tressa Garcia    Patient : 1945   MRN: [id number]    Reason for Admission: Chest pain, pulmonary nodule   Discharge Date: 24  RURS: Readmission Risk Score: 6.7    Last Discharge Facility       Date Complaint Diagnosis Description Type Department Provider    24 Hypertension Chest pain, unspecified type ... ED to Hosp-Admission (Discharged) (ADMITTED) [facility] Rico Avendano MD; Luisa Gomez..            Was this an external facility discharge? No    Follow Up Appointment:   Patient has hospital follow up appointment scheduled within 7 days of discharge.  Will route message to PCP requesting conversion to a hospital follow up 24,  Future Appointments         Provider Specialty Dept Phone    2024 12:45 PM Reyes, Eleia J, MD Primary Care [phone]    2024 3:45 PM Lorenzo Gómez MD Cardiology [phone]    2024 2:30 PM Lorenzo Gómez MD Cardiology [phone]    2024 11:15 AM Reyes, Eleia J, MD Primary Care [phone]            Plan for follow-up call in 6-10 days     RAFFY SANTIAGO RN

## 2024-06-11 ENCOUNTER — CARE COORDINATION (OUTPATIENT)
Dept: CASE MANAGEMENT | Age: 79
End: 2024-06-11

## 2024-06-11 ENCOUNTER — TELEPHONE (OUTPATIENT)
Dept: CARDIOLOGY CLINIC | Age: 79
End: 2024-06-11

## 2024-06-11 DIAGNOSIS — R07.9 CHEST PAIN, UNSPECIFIED TYPE: Primary | ICD-10-CM

## 2024-06-11 PROCEDURE — 1111F DSCHRG MED/CURRENT MED MERGE: CPT | Performed by: FAMILY MEDICINE

## 2024-06-11 NOTE — CARE COORDINATION
Care Transitions Note  Initial Call - Call within 2 business days of discharge: Yes    Attempted to reach patient for transitions of care follow up. Unable to reach patient.    Outreach Attempts:   HIPAA compliant voicemail left for patient.     Patient: Tressa Garcia    Patient : 1945   MRN: [id number]    Reason for Admission: chest pain, lung nodule  Discharge Date: 24  RURS: Readmission Risk Score: 6.7    Last Discharge Facility       Date Complaint Diagnosis Description Type Department Provider    24 Hypertension Chest pain, unspecified type ... ED to Hosp-Admission (Discharged) (ADMITTED) [facility] Rico Avendano MD; Luisa Gomez..            Was this an external facility discharge? No    Follow Up Appointment:   Patient does not have a follow up appointment scheduled at time of call.  Did not reach patient, message has been routed to PCP requesting conversion of appointment to hospital follow up.  Future Appointments         Provider Specialty Dept Phone    2024 12:45 PM Reyes, Eleia J, MD Primary Care [phone]    2024 3:45 PM Lorenzo Gómez MD Cardiology [phone]    2024 2:30 PM Lorenzo Gómez MD Cardiology [phone]    2024 11:15 AM Reyes, Eleia J, MD Primary Care [phone]            No further follow-up call indicated  CTN will close by end of day if patient does not return call.     RAFFY SANTIAGO RN

## 2024-06-11 NOTE — TELEPHONE ENCOUNTER
Spoke with patient she was okay with coming in early to extend appt to hospital follow up. Appt up dated

## 2024-06-11 NOTE — CARE COORDINATION
Office routed message to CTN confirming that 6/14/24 appointment has been converted to a hospital follow up.

## 2024-06-11 NOTE — TELEPHONE ENCOUNTER
----- Message from Lorenzo Gómez MD sent at 6/11/2024  9:50 AM EDT -----  Unchanged from prior monitors.  A few short runs of NSVT, which aren't new. Continue beta blocker

## 2024-06-11 NOTE — CARE COORDINATION
Care Transitions Note  Initial Call - Call within 2 business days of discharge: Yes    Patient Current Location:  Home: [address] [place] [address]    Care Transition Nurse contacted the patient by telephone to perform post hospital discharge assessment, verified name and  as identifiers. Provided introduction to self, and explanation of the Care Transition Nurse role.     Patient: Tressa Garcia    Patient : 1945   MRN: [id number]    Reason for Admission: Chest pain, pulmonary nodule   Discharge Date: 24  RURS: Readmission Risk Score: 6.7      Last Discharge Facility       Date Complaint Diagnosis Description Type Department Provider    24 Hypertension Chest pain, unspecified type ... ED to Hosp-Admission (Discharged) (ADMITTED) [facility] Rico Avendano MD; Luisa Gomez..            Was this an external facility discharge? No    Additional needs identified to be addressed with provider   No needs identified             Method of communication with provider: none.    Patients top risk factors for readmission: medical condition-.    Interventions to address risk factors:   Review of patient management of conditions/medications: .    Care Summary Note: Patient reports that she is having trouble with her BP, feeling weak and tired, having palpitations, feeling of numbness in hands & feet.  She has spoken with Dr. Reyes and she has adjusted her BP medication. Patient is declining RPM due to cost.  Discussed discharge instructions and reviewed medications, 1111F completed.  She is scheduled to follow up with PCP this 24, CTN will route message to PCP requesting that appointment be converted to hospital follow up.  She is inquiring about a new ENT for a 2nd opinion, CTN provided her with the physician referral line phone number [phone].  CTN will continue with outreach follow up calls.      Care Transition Nurse reviewed discharge instructions, medical action plan,

## 2024-06-14 ENCOUNTER — OFFICE VISIT (OUTPATIENT)
Dept: PRIMARY CARE CLINIC | Age: 79
End: 2024-06-14

## 2024-06-14 VITALS
DIASTOLIC BLOOD PRESSURE: 80 MMHG | HEIGHT: 59 IN | OXYGEN SATURATION: 98 % | WEIGHT: 139 LBS | BODY MASS INDEX: 28.02 KG/M2 | HEART RATE: 60 BPM | RESPIRATION RATE: 16 BRPM | SYSTOLIC BLOOD PRESSURE: 170 MMHG

## 2024-06-14 DIAGNOSIS — E11.9 DIABETES MELLITUS TYPE II, NON INSULIN DEPENDENT (HCC): ICD-10-CM

## 2024-06-14 DIAGNOSIS — Z09 HOSPITAL DISCHARGE FOLLOW-UP: Primary | ICD-10-CM

## 2024-06-14 DIAGNOSIS — R13.12 OROPHARYNGEAL DYSPHAGIA: ICD-10-CM

## 2024-06-14 DIAGNOSIS — I10 ESSENTIAL HYPERTENSION, BENIGN: ICD-10-CM

## 2024-06-14 PROBLEM — R07.9 CHEST PAIN: Status: RESOLVED | Noted: 2024-06-05 | Resolved: 2024-06-14

## 2024-06-14 PROBLEM — R42 DIZZINESS: Status: RESOLVED | Noted: 2024-03-11 | Resolved: 2024-06-14

## 2024-06-14 LAB — HBA1C MFR BLD: 6.4 %

## 2024-06-14 NOTE — PROGRESS NOTES
Chief Complaint   Patient presents with    Follow-Up from Hospital     High Blood Pressure          Subjective:       Tressa Garcia is a 78 y.o. female here for hospital follow-up from 6/5/2024 due to poorly controlled hypertension.  Had extensive workup and so far unremarkable.  Patient's been compliant with medication.  Blood pressure at home still elevated with systolic blood pressure over 160.  Patient continues to have dysphagia and sometimes is hard to swallow.  Has known GERD and the last scope was in 2021.  Patient takes PPI.    Reviewed blood test done on 6/6/2024: Sodium 140, potassium 4.0, glucose 117, BUN 16, creatinine 0.6, calcium 9.4, total cholesterol 134, triglyceride 137, HDL 41, LDL 68, WBC 5.9, hemoglobin 13.8, hematocrit 39.7, platelets 188, TSH 3.86    Current Outpatient Medications   Medication Sig Dispense Refill    hydrALAZINE (APRESOLINE) 10 MG tablet Take 1 tablet by mouth 3 times daily For BP >150/100 90 tablet 3    losartan (COZAAR) 100 MG tablet Take 1 tablet by mouth at bedtime (Patient taking differently: Take 0.5 tablets by mouth at bedtime Twice daily) 30 tablet 3    triamcinolone (KENALOG) 0.1 % cream Apply topically 2 times daily. 15 g 0    metFORMIN (GLUCOPHAGE-XR) 500 MG extended release tablet Take 1 tablet by mouth daily (with breakfast) 90 tablet 3    atorvastatin (LIPITOR) 10 MG tablet Take 1 tablet by mouth nightly 90 tablet 3    levothyroxine (SYNTHROID) 75 MCG tablet Take 1 tablet by mouth daily 90 tablet 3    pantoprazole (PROTONIX) 40 MG tablet Take 1 tablet by mouth daily 90 tablet 3    metoprolol succinate (TOPROL XL) 100 MG extended release tablet Take 1 tablet by mouth daily 90 tablet 3    nystatin (MYCOSTATIN) 153324 UNIT/GM ointment Apply topically 2 times daily. 30 g 0    estradiol (ESTRACE) 0.1 MG/GM vaginal cream  (Patient not taking: Reported on 6/14/2024)       No current facility-administered medications for this visit.      Allergies   Allergen

## 2024-06-18 ENCOUNTER — CARE COORDINATION (OUTPATIENT)
Dept: CASE MANAGEMENT | Age: 79
End: 2024-06-18

## 2024-06-18 NOTE — CARE COORDINATION
Care Transitions Note  Follow Up Call     Attempted to reach patient for transitions of care follow up.  Unable to reach patient.      Outreach Attempts:   HIPAA compliant voicemail left for patient.     Care Summary Note: Follow up outreach call attempt, no answer.  CTN left VM with contact information and request for return call.  CTN will continue with outreach call attempts.      Follow Up Appointment:   Future Appointments         Provider Specialty Dept Phone    6/28/2024 12:00 PM Reyes, Eleia J, MD [facility] [phone]    7/11/2024 3:45 PM Lorenzo Gómez MD Cardiology [phone]    9/19/2024 2:30 PM Lorenzo Gómez MD Cardiology [phone]    9/20/2024 11:15 AM Reyes, Eleia J, MD Primary Care [phone]            Plan for follow-up call in 2-5 days based on severity of symptoms and risk factors. Plan for next call: symptom management-.  self management-.  follow-up appointment-.    RAFFY SANTIAGO RN

## 2024-06-19 NOTE — PROGRESS NOTES
[facility]      CONSULTATION  [phone]  7/11/24  Referring: Dr. Reyes, Eleia J, MD (PCP)    REASON FOR CONSULT/CHIEF COMPLAINT/HPI     Reason for visit/ Chief complaint  Hypertrophic obstructive cardiomyopathy  Dizziness   HPI Tressa Garcia is a 78 y.o.  female patient here for follow up.     Past medical history significant for hypertension, hyperlipidemia, and cardiomyopathy.     Today she says that her BP has been up and down. She say Dr Reyes and she said to discuss today. Her head has been bother her in the center, she sees a lot of flashes and black spots, gets dizzy when looking down. They did not find anything. She may need surgery to lift her eyelids. She complains today of a lot of palpitations daily that are strong.     Patient is adherent with medications and is tolerating them well without side effects.     HISTORY/ALLERGIES/ROS     MedHx:  has a past medical history of Age-related osteoporosis without current pathological fracture, Age-related osteoporosis without current pathological fracture, Arthritis, Chronic back pain, Colitis, Diabetes mellitus (HCC), Enteritis, Exposure to hepatitis B, Fibromyalgia, Gallstones, GERD (gastroesophageal reflux disease), Heart murmur, History of chickenpox, Hyperlipidemia, Hypertension, Hypothyroidism, Meniere's disease, Osteoarthritis, Prediabetes, Seasonal allergies, Submuscular lipoma of chest, and Vertigo.  SurgHx:  has a past surgical history that includes Colon surgery (12/05/2003); Cholecystectomy; cyst removal; Hysterectomy, total abdominal; other surgical history (09/26/2016); Colonoscopy (12/05/2003); Colonoscopy (05/01/2015); Colonoscopy (05/31/2018); Cataract removal with implant (Left, 06/03/2014); Cataract removal with implant (Right, 06/23/2020); Upper gastrointestinal endoscopy (N/A, 12/03/2021); Colonoscopy (N/A, 12/03/2021); eye surgery (3 years ago); and Cystoscopy (Right, 10/19/2022).   SocHx:  reports that she has

## 2024-06-21 ENCOUNTER — CARE COORDINATION (OUTPATIENT)
Dept: CASE MANAGEMENT | Age: 79
End: 2024-06-21

## 2024-06-21 NOTE — CARE COORDINATION
Earnestine STRICKLAND MD Primary Care [phone]            Care Transition Nurse provided contact information.  Plan for follow-up call in 6-10 days based on severity of symptoms and risk factors.  Plan for next call: symptom management-.  self management-.  follow-up appointment-.      RAFFY SANTIAGO RN

## 2024-06-28 ENCOUNTER — OFFICE VISIT (OUTPATIENT)
Dept: PRIMARY CARE CLINIC | Age: 79
End: 2024-06-28
Payer: MEDICARE

## 2024-06-28 ENCOUNTER — CARE COORDINATION (OUTPATIENT)
Dept: CASE MANAGEMENT | Age: 79
End: 2024-06-28

## 2024-06-28 VITALS
HEART RATE: 61 BPM | HEIGHT: 59 IN | DIASTOLIC BLOOD PRESSURE: 80 MMHG | SYSTOLIC BLOOD PRESSURE: 160 MMHG | OXYGEN SATURATION: 97 % | WEIGHT: 138 LBS | BODY MASS INDEX: 27.82 KG/M2 | RESPIRATION RATE: 16 BRPM

## 2024-06-28 DIAGNOSIS — R09.89 LABILE HYPERTENSION: Primary | ICD-10-CM

## 2024-06-28 DIAGNOSIS — R10.30 LOWER ABDOMINAL PAIN: ICD-10-CM

## 2024-06-28 LAB
BILIRUBIN, POC: NEGATIVE
BLOOD URINE, POC: NEGATIVE
CLARITY, POC: CLEAR
COLOR, POC: YELLOW
GLUCOSE URINE, POC: NEGATIVE
KETONES, POC: NEGATIVE
LEUKOCYTE EST, POC: NEGATIVE
NITRITE, POC: ABNORMAL
PH, POC: 5.5
PROTEIN, POC: NEGATIVE
SPECIFIC GRAVITY, POC: 1
UROBILINOGEN, POC: 0.2

## 2024-06-28 PROCEDURE — 3077F SYST BP >= 140 MM HG: CPT | Performed by: FAMILY MEDICINE

## 2024-06-28 PROCEDURE — 99212 OFFICE O/P EST SF 10 MIN: CPT | Performed by: FAMILY MEDICINE

## 2024-06-28 PROCEDURE — 3079F DIAST BP 80-89 MM HG: CPT | Performed by: FAMILY MEDICINE

## 2024-06-28 PROCEDURE — 81002 URINALYSIS NONAUTO W/O SCOPE: CPT | Performed by: FAMILY MEDICINE

## 2024-06-28 PROCEDURE — 1123F ACP DISCUSS/DSCN MKR DOCD: CPT | Performed by: FAMILY MEDICINE

## 2024-06-28 NOTE — PROGRESS NOTES
Chief Complaint   Patient presents with    Hypertension    Abdominal Pain     Lower believes it might be UTI        Subjective:       Tressa Garcia is a 78 y.o. female here for follow-up from 6/14/2024 for blood pressure check.  Blood pressure at that time was 170/80.  Currently takes losartan 100 mg nightly, Toprol- mg daily and started hydralazine 10 mg 3 times a day instead of as needed.  Unfortunately, patient was still taking it as needed when systolic blood pressure over 160.  Blood pressure at home goes up to 200 sometimes.  Gets dizzy off-and-on.  Also complaining of lower abdominal pain and wants to get checked for UTI.  Denies any dysuria.  No gross hematuria.  Patient reports pain radiates to her perineum and to the back.    Current Outpatient Medications   Medication Sig Dispense Refill    hydrALAZINE (APRESOLINE) 10 MG tablet Take 1 tablet by mouth 3 times daily For BP >150/100 (Patient taking differently: Take 1 tablet by mouth 3 times daily Hold if SBP <90) 90 tablet 3    losartan (COZAAR) 100 MG tablet Take 1 tablet by mouth at bedtime 30 tablet 3    metFORMIN (GLUCOPHAGE-XR) 500 MG extended release tablet Take 1 tablet by mouth daily (with breakfast) 90 tablet 3    atorvastatin (LIPITOR) 10 MG tablet Take 1 tablet by mouth nightly 90 tablet 3    levothyroxine (SYNTHROID) 75 MCG tablet Take 1 tablet by mouth daily 90 tablet 3    pantoprazole (PROTONIX) 40 MG tablet Take 1 tablet by mouth daily 90 tablet 3    metoprolol succinate (TOPROL XL) 100 MG extended release tablet Take 1 tablet by mouth daily 90 tablet 3    estradiol (ESTRACE) 0.1 MG/GM vaginal cream       triamcinolone (KENALOG) 0.1 % cream Apply topically 2 times daily. (Patient not taking: Reported on 6/28/2024) 15 g 0    nystatin (MYCOSTATIN) 026943 UNIT/GM ointment Apply topically 2 times daily. (Patient not taking: Reported on 6/28/2024) 30 g 0     No current facility-administered medications for this visit.

## 2024-07-02 ENCOUNTER — CARE COORDINATION (OUTPATIENT)
Dept: CASE MANAGEMENT | Age: 79
End: 2024-07-02

## 2024-07-02 NOTE — CARE COORDINATION
Care Transitions Note    Follow Up Call     Attempted to reach patient for transitions of care follow up.  Unable to reach patient.      Outreach Attempts:   HIPAA compliant voicemail left for patient.     Care Summary Note: Follow up outreach call attempt, no answer.  CTN left VM with contact information and request for return call.  CTN will continue with outreach call attempts.      Follow Up Appointment:   Future Appointments         Provider Specialty Dept Phone    7/11/2024 3:45 PM Lorenzo Gómez MD Cardiology [phone]    7/12/2024 4:00 PM Reyes, Eleia J, MD [facility] [phone]    9/19/2024 2:30 PM Lorenzo Gómez MD Cardiology [phone]    9/20/2024 11:15 AM Reyes, Eleia J, MD [facility] [phone]            Plan for follow-up call in 6-10 days based on severity of symptoms and risk factors. Plan for next call: symptom management-.  self management-.  follow-up appointment-.    RAFFY SANTIAGO RN

## 2024-07-02 NOTE — CARE COORDINATION
Care Transitions Note    Follow Up Call     Patient Current Location:  Home: [address] [address]    Care Transition Nurse contacted the patient by telephone. Verified name and  as identifiers.    Additional needs identified to be addressed with provider   No needs identified                 Method of communication with provider: none.    Care Summary Note: Patient reports that she is doing \"OK\".  She followed up with Dr. Reyes and she is adjusting her blood pressure medications.  CTN sent her the CPT codes to her My Chart account, she will call insurance to determine the cost.  Patient reports ongoing transportation concerns, her  is able to take her to follow up appointments on .  CTN will refer to  for ride assistance.  CTN will continue with outreach follow up calls.    Plan of care updates since last contact:  Education: .  Review of patient management of conditions/medications: .       Remote Patient Monitoring:  Offered patient enrollment in the Remote Patient Monitoring (RPM) program for in-home monitoring: Yes, but did not enroll at this time: sent CPT codes to patient My Chart .    Assessments:  Care Transitions Subsequent and Final Call    Subsequent and Final Calls  Do you have any ongoing symptoms?: No  Have your medications changed?: No  Do you have any questions related to your medications?: No  Do you currently have any active services?: No  Do you have any needs or concerns that I can assist you with?: No  Identified Barriers: None  Care Transitions Interventions  Other Interventions:              Follow Up Appointment:     Future Appointments         Provider Specialty Dept Phone    2024 3:45 PM Lorenzo Gómez MD Cardiology [phone]    2024 4:00 PM Reyes, Eleia J, MD Primary Care [phone]    2024 2:30 PM Lorenzo Gómez MD Cardiology [phone]    2024 11:15 AM Reyes, Eleia J, MD Primary Care [phone]            Care

## 2024-07-05 ENCOUNTER — CARE COORDINATION (OUTPATIENT)
Dept: CARE COORDINATION | Age: 79
End: 2024-07-05

## 2024-07-05 NOTE — CARE COORDINATION
Call to pt to address transportation concern. SW introduced and purpose of call explained. Pt reported that she has used the [facility] transportation in the past however they do not have enough drivers. She does not have transportation benefits through her insurance. SW attempted to schedule through [employer] however the only option for her was discharges from facility or interfacility transport.    Contacted [facility] to determine if she was eligible however there was no answer. The application procees takes three weeks for approval.    Pt stated she will see if a friend is able to take her. She only needs transportation to the appt and her  will pick her up.     SW will f/u.

## 2024-07-09 ENCOUNTER — CARE COORDINATION (OUTPATIENT)
Dept: CASE MANAGEMENT | Age: 79
End: 2024-07-09

## 2024-07-09 ENCOUNTER — TELEPHONE (OUTPATIENT)
Dept: PRIMARY CARE CLINIC | Age: 79
End: 2024-07-09

## 2024-07-09 NOTE — CARE COORDINATION
Care Transitions Note    Final Call     Patient Current Location:  Home: [address] [address]    Care Transition Nurse contacted the patient by telephone. Verified name and  as identifiers.    Patient graduated from the Care Transitions program on 24.  Patient/family verbalizes confidence in the ability to self-manage at this time..      Advance Care Planning:   Does patient have an Advance Directive: not on file; education provided - . .    Handoff:   Patient was not referred to the ACM team due to no additional needs identified.       Care Summary Note: Final outreach call:  Patient reports that she is doing \"OK\".  Her blood pressure has been \"OK\".  She denies any chest pain and SOB. She will be following up with cardiology 24, she is still trying to find a ride to the appointment.  She is frustrated that [facility] has a [employer] ride service and she is not eligible for this.  She will try to have a friend take her and her  can pick her up.  CTN will close program & remain available.    Assessments:  Care Transitions Subsequent and Final Call    Subsequent and Final Calls  Do you have any ongoing symptoms?: No  Have your medications changed?: No  Do you have any questions related to your medications?: No  Do you currently have any active services?: No  Do you have any needs or concerns that I can assist you with?: No  Identified Barriers: None  Care Transitions Interventions  Other Interventions:              Upcoming Appointments:    Future Appointments         Provider Specialty Dept Phone    2024 3:45 PM Lorenzo Gómez MD Cardiology [phone]    2024 4:00 PM Reyes, Eleia J, MD Primary Care [phone]    2024 2:30 PM Lorenzo Gómez MD Cardiology [phone]    2024 11:15 AM Reyes, Eleia J, MD Primary Care [phone]            Patient has agreed to contact primary care provider and/or specialist for any further questions, concerns, or

## 2024-07-09 NOTE — TELEPHONE ENCOUNTER
Pt requesting refill for pantoprazole (PROTONIX) 40 MG tablet. Prefers [facility] pharmacy in [place]. Requested return call.

## 2024-07-10 ENCOUNTER — CARE COORDINATION (OUTPATIENT)
Dept: CARE COORDINATION | Age: 79
End: 2024-07-10

## 2024-07-10 NOTE — TELEPHONE ENCOUNTER
Will call pt to verify insurance. I am receiving faxed from the mail service. Medications have been going there.

## 2024-07-10 NOTE — TELEPHONE ENCOUNTER
Patients barry Vicente would like to know that status of the FMLA forms that was dropped off last week. Please call [phone]   Spoke with the pt. Pt has omeprazole on hand so she will take that until her medications come in the mail. She didn't want to purchase pepcid since she had the others on hand

## 2024-07-10 NOTE — TELEPHONE ENCOUNTER
Pt called inquiring status of her telephone call reg.protonix per pt her insurance is the same and has not changed.     Pt also wants to know if she will be okay-safe not to take her protonix for the next to weeks since mail-in order takes two weeks to process.

## 2024-07-10 NOTE — CARE COORDINATION
F/u call to pt. SW was able to schedule transportation for appt tomorrow through [employer]. Pt  will transport her tomorrow as he will be off work.     SW will f/u to ensure transportation was successful.

## 2024-07-10 NOTE — TELEPHONE ENCOUNTER
Error in my previous message. Wanted to verify pharmacy not insurance. Appears she answered the other reason for call. Please advise if pt can hold protonix until her mail script comes in

## 2024-07-11 ENCOUNTER — OFFICE VISIT (OUTPATIENT)
Dept: CARDIOLOGY CLINIC | Age: 79
End: 2024-07-11
Payer: MEDICARE

## 2024-07-11 VITALS
DIASTOLIC BLOOD PRESSURE: 68 MMHG | BODY MASS INDEX: 27.67 KG/M2 | OXYGEN SATURATION: 100 % | HEART RATE: 76 BPM | WEIGHT: 137 LBS | SYSTOLIC BLOOD PRESSURE: 130 MMHG

## 2024-07-11 DIAGNOSIS — I47.19 PAT (PAROXYSMAL ATRIAL TACHYCARDIA) (HCC): Primary | ICD-10-CM

## 2024-07-11 DIAGNOSIS — R00.2 PALPITATIONS: ICD-10-CM

## 2024-07-11 DIAGNOSIS — I47.29 NSVT (NONSUSTAINED VENTRICULAR TACHYCARDIA) (HCC): ICD-10-CM

## 2024-07-11 DIAGNOSIS — R42 DIZZINESS: ICD-10-CM

## 2024-07-11 PROCEDURE — 3075F SYST BP GE 130 - 139MM HG: CPT | Performed by: INTERNAL MEDICINE

## 2024-07-11 PROCEDURE — 99214 OFFICE O/P EST MOD 30 MIN: CPT | Performed by: INTERNAL MEDICINE

## 2024-07-11 PROCEDURE — 1123F ACP DISCUSS/DSCN MKR DOCD: CPT | Performed by: INTERNAL MEDICINE

## 2024-07-11 PROCEDURE — 3078F DIAST BP <80 MM HG: CPT | Performed by: INTERNAL MEDICINE

## 2024-07-11 RX ORDER — METOPROLOL SUCCINATE 100 MG/1
150 TABLET, EXTENDED RELEASE ORAL DAILY
Qty: 135 TABLET | Refills: 3 | Status: SHIPPED | OUTPATIENT
Start: 2024-07-11 | End: 2024-07-11

## 2024-07-11 RX ORDER — METOPROLOL SUCCINATE 100 MG/1
150 TABLET, EXTENDED RELEASE ORAL DAILY
Qty: 135 TABLET | Refills: 3 | Status: SHIPPED | OUTPATIENT
Start: 2024-07-11 | End: 2024-07-12 | Stop reason: SDUPTHER

## 2024-07-11 NOTE — PATIENT INSTRUCTIONS
Follow up with Dr Gómez in     Increase your Metoprolol to 150mg daily. Call us if you have increased dizziness or your blood pressure going low.     Call for any questions or concerns.

## 2024-07-12 ENCOUNTER — OFFICE VISIT (OUTPATIENT)
Dept: PRIMARY CARE CLINIC | Age: 79
End: 2024-07-12
Payer: MEDICARE

## 2024-07-12 VITALS
WEIGHT: 136 LBS | BODY MASS INDEX: 27.47 KG/M2 | DIASTOLIC BLOOD PRESSURE: 70 MMHG | SYSTOLIC BLOOD PRESSURE: 124 MMHG | HEART RATE: 66 BPM | OXYGEN SATURATION: 98 %

## 2024-07-12 DIAGNOSIS — R09.89 LABILE HYPERTENSION: Primary | ICD-10-CM

## 2024-07-12 PROBLEM — I47.10 SVT (SUPRAVENTRICULAR TACHYCARDIA) (HCC): Status: RESOLVED | Noted: 2024-02-27 | Resolved: 2024-07-12

## 2024-07-12 PROBLEM — I16.0 HYPERTENSIVE URGENCY: Status: RESOLVED | Noted: 2024-06-05 | Resolved: 2024-07-12

## 2024-07-12 PROCEDURE — 1123F ACP DISCUSS/DSCN MKR DOCD: CPT | Performed by: FAMILY MEDICINE

## 2024-07-12 PROCEDURE — 3078F DIAST BP <80 MM HG: CPT | Performed by: FAMILY MEDICINE

## 2024-07-12 PROCEDURE — 3074F SYST BP LT 130 MM HG: CPT | Performed by: FAMILY MEDICINE

## 2024-07-12 PROCEDURE — 99213 OFFICE O/P EST LOW 20 MIN: CPT | Performed by: FAMILY MEDICINE

## 2024-07-12 RX ORDER — METOPROLOL SUCCINATE 50 MG/1
150 TABLET, EXTENDED RELEASE ORAL DAILY
Qty: 270 TABLET | Refills: 3 | Status: SHIPPED | OUTPATIENT
Start: 2024-07-12

## 2024-07-12 RX ORDER — HYDRALAZINE HYDROCHLORIDE 10 MG/1
10 TABLET, FILM COATED ORAL 3 TIMES DAILY
COMMUNITY

## 2024-07-12 NOTE — PROGRESS NOTES
Chief Complaint   Patient presents with    Hypertension     Pt asking at what times she should be taking her BP medications       Subjective:       Tressa Garcia is a 78 y.o. female here for follow-up from 6/28/2024 for blood pressure check.  Blood pressure at that time was 180/80.  Currently on hydralazine 10 mg 3 times a day, losartan 100 mg at noon, Toprol- mg at night.  Patient tolerating current regimen and so far blood pressures been stable.  No new concerns.      7/11/2024: Cardiology follow-up with Dr. BURNETT for her PAT, hypertension and obstructive cardiomyopathy follow-up.  Blood pressure at that time was 130/68 and a heart rate of 76.  Increase metoprolol from 100 to 150 mg daily.    Reviewed blood test done on 6/6/2024: Total cholesterol 134, triglyceride 127, HDL 41, LDL 68, WBC 5.9, hemoglobin 13.8, hematocrit 39.7, platelets 188, sodium 140, potassium 4.0, glucose 117, creatinine 0.6, calcium 9.4    POC A1c on 6/14/2024 was 6.4%.    Current Outpatient Medications   Medication Sig Dispense Refill    Wheat Dextrin (BENEFIBER PO) 1 tablespoon Orally daily for 30 days      hydrALAZINE (APRESOLINE) 10 MG tablet Take 1 tablet by mouth 3 times daily But hold if SBP<90      metoprolol succinate (TOPROL XL) 50 MG extended release tablet Take 3 tablets by mouth daily (Patient taking differently: Take 3 tablets by mouth nightly) 270 tablet 3    losartan (COZAAR) 100 MG tablet Take 1 tablet by mouth at bedtime (Patient taking differently: Take 1 tablet by mouth Daily with lunch) 30 tablet 3    metFORMIN (GLUCOPHAGE-XR) 500 MG extended release tablet Take 1 tablet by mouth daily (with breakfast) 90 tablet 3    atorvastatin (LIPITOR) 10 MG tablet Take 1 tablet by mouth nightly 90 tablet 3    levothyroxine (SYNTHROID) 75 MCG tablet Take 1 tablet by mouth daily 90 tablet 3    pantoprazole (PROTONIX) 40 MG tablet Take 1 tablet by mouth daily 90 tablet 3     No current facility-administered medications for

## 2024-07-16 ENCOUNTER — CARE COORDINATION (OUTPATIENT)
Dept: CARE COORDINATION | Age: 79
End: 2024-07-16

## 2024-07-16 NOTE — CARE COORDINATION
Incoming call from pt. She was pleased with [employer] . She reported he was on time, pleasant and engaging. She thanked  for arranging transportation. Pt does not have any appts until September,

## 2024-07-18 ENCOUNTER — TELEPHONE (OUTPATIENT)
Dept: PRIMARY CARE CLINIC | Age: 79
End: 2024-07-18

## 2024-07-18 DIAGNOSIS — I10 ESSENTIAL HYPERTENSION, BENIGN: Primary | ICD-10-CM

## 2024-07-18 RX ORDER — HYDRALAZINE HYDROCHLORIDE 10 MG/1
10 TABLET, FILM COATED ORAL 3 TIMES DAILY
Qty: 270 TABLET | Refills: 3 | Status: SHIPPED | OUTPATIENT
Start: 2024-07-18

## 2024-07-18 NOTE — TELEPHONE ENCOUNTER
Pt calling requesting to speak directly with PCP ONLY reg.BP readings and refill needed on hydrALAZINE (APRESOLINE) 10 MG tablet      BP readings:  Unknown date; 170/83   07/18/24 Thur.  173/112 just now

## 2024-07-18 NOTE — TELEPHONE ENCOUNTER
Spoke with the patient, just requesting refill of her hydralazine.  She took the last dose today.  Prescription sent to [facility] for 90-day supply and 3 refills.

## 2024-07-22 DIAGNOSIS — K21.9 GERD WITHOUT ESOPHAGITIS: ICD-10-CM

## 2024-07-22 NOTE — TELEPHONE ENCOUNTER
Pt requested refill on   pantoprazole (PROTONIX) 40 MG tablet   [id number] [address] - P [phone]

## 2024-07-23 RX ORDER — PANTOPRAZOLE SODIUM 40 MG/1
40 TABLET, DELAYED RELEASE ORAL DAILY
Qty: 90 TABLET | Refills: 3 | Status: SHIPPED | OUTPATIENT
Start: 2024-07-23

## 2024-07-24 NOTE — PROGRESS NOTES
Patient reached __X__ yes  _____ no   VM instructions left ____ yes   phone number ________                                ____ no-office notified          Date _7/31/24________  Time __0730_____  Arrival _0600  h[facility]    Nothing to eat or drink after midnight-follow your doctors prep instructions-this may include taking a second dose of your prep after midnight  Responsible adult 18 or older to stay on site while you are here-drive you home-stay with you after  Follow any instructions your doctors office has given you  Bring a complete list of all your medications and supplements including name,dose,how often taken the day of your procedure  If you normally take the following medications in the morning please do so the AM of your procedure with a small sip of water       Heart,blood pressure,seizure,thyroid or breathing medications-use your inhalers-bring any rescue inhalers with you DOS       DO NOT take blood pressure medications ending in \"bhanu\" or \"pril\" the AM of procedure or evening prior-DO NOT TAKE LOSARTAN  Dr Sneed patients are not to take any medications the AM of surgery  Take half or your normal dose of any long acting insulins the night before your procedure-do not take any diabetic medications the AM of procedure. If you take a weekly injection for diabetes or weight loss-do not take one week prior to surgery/procedure.If you have already taken your injection this week,contact your surgeon  Follow your doctors instructions regarding stopping or taking  any blood thinners-if you do not have instructions-call them  Any questions call your doctor  Other _______take hydralazine prn, levothyroxine am of procedure_______________________________________________________      VISITOR POLICY(subject to change)             The current policy is 2 visitors per patient.There are no children allowed.Mask at discretion of facility. Visiting hours are 8a-8p.Overnight visitors will be at the discretion of the

## 2024-07-26 ENCOUNTER — NURSE ONLY (OUTPATIENT)
Dept: PRIMARY CARE CLINIC | Age: 79
End: 2024-07-26

## 2024-07-26 VITALS — DIASTOLIC BLOOD PRESSURE: 96 MMHG | SYSTOLIC BLOOD PRESSURE: 208 MMHG | HEART RATE: 60 BPM

## 2024-07-26 DIAGNOSIS — I1A.0 RESISTANT HYPERTENSION: Primary | ICD-10-CM

## 2024-07-26 RX ORDER — HYDRALAZINE HYDROCHLORIDE 25 MG/1
25 TABLET, FILM COATED ORAL 3 TIMES DAILY
Qty: 90 TABLET | Refills: 3 | Status: SHIPPED | OUTPATIENT
Start: 2024-07-26

## 2024-07-26 NOTE — PROGRESS NOTES
Patient BP still elevated at 172/92. On Losartan 100 mg daily, Hydralazine 10 mg TID, Toprol  mg nightly.    Will increase Hydralazine to 25 mg TID and refer to Nephrologist to help manage BP    Orders Placed This Encounter   Procedures    [facility] (Epic) - Breana Carrizales MD, Nephrology ([facility]), [facility]     Referral Priority:   Urgent     Referral Type:   Eval and Treat     Referral Reason:   Specialty Services Required     Referred to Provider:   Breana Carrizales MD     Requested Specialty:   Nephrology     Number of Visits Requested:   1        Electronically signed by Eleia J Reyes, MD on 7/26/2024 at 4:22 PM

## 2024-07-31 ENCOUNTER — ANESTHESIA EVENT (OUTPATIENT)
Dept: ENDOSCOPY | Age: 79
End: 2024-07-31
Payer: MEDICARE

## 2024-07-31 ENCOUNTER — HOSPITAL ENCOUNTER (OUTPATIENT)
Age: 79
Setting detail: OUTPATIENT SURGERY
Discharge: HOME | End: 2024-07-31
Attending: INTERNAL MEDICINE | Admitting: INTERNAL MEDICINE
Payer: MEDICARE

## 2024-07-31 ENCOUNTER — ANESTHESIA (OUTPATIENT)
Dept: ENDOSCOPY | Age: 79
End: 2024-07-31
Payer: MEDICARE

## 2024-07-31 VITALS
TEMPERATURE: 97.5 F | SYSTOLIC BLOOD PRESSURE: 166 MMHG | DIASTOLIC BLOOD PRESSURE: 70 MMHG | HEIGHT: 57 IN | BODY MASS INDEX: 29.99 KG/M2 | HEART RATE: 61 BPM | WEIGHT: 139 LBS | OXYGEN SATURATION: 98 % | RESPIRATION RATE: 18 BRPM

## 2024-07-31 DIAGNOSIS — R13.10 DYSPHAGIA, UNSPECIFIED TYPE: ICD-10-CM

## 2024-07-31 DIAGNOSIS — K21.9 GASTROESOPHAGEAL REFLUX DISEASE, UNSPECIFIED WHETHER ESOPHAGITIS PRESENT: ICD-10-CM

## 2024-07-31 LAB
GLUCOSE BLD-MCNC: 87 MG/DL (ref 70–99)
GLUCOSE BLD-MCNC: 96 MG/DL (ref 70–99)
PERFORMED ON: NORMAL
PERFORMED ON: NORMAL

## 2024-07-31 PROCEDURE — 3700000001 HC ADD 15 MINUTES (ANESTHESIA): Performed by: INTERNAL MEDICINE

## 2024-07-31 PROCEDURE — 7100000000 HC PACU RECOVERY - FIRST 15 MIN: Performed by: INTERNAL MEDICINE

## 2024-07-31 PROCEDURE — 3609012400 HC EGD TRANSORAL BIOPSY SINGLE/MULTIPLE: Performed by: INTERNAL MEDICINE

## 2024-07-31 PROCEDURE — 2580000003 HC RX 258: Performed by: ANESTHESIOLOGY

## 2024-07-31 PROCEDURE — 2709999900 HC NON-CHARGEABLE SUPPLY: Performed by: INTERNAL MEDICINE

## 2024-07-31 PROCEDURE — 7100000010 HC PHASE II RECOVERY - FIRST 15 MIN: Performed by: INTERNAL MEDICINE

## 2024-07-31 PROCEDURE — 3609017700 HC EGD DILATION GASTRIC/DUODENAL STRICTURE: Performed by: INTERNAL MEDICINE

## 2024-07-31 PROCEDURE — 3700000000 HC ANESTHESIA ATTENDED CARE: Performed by: INTERNAL MEDICINE

## 2024-07-31 PROCEDURE — 7100000001 HC PACU RECOVERY - ADDTL 15 MIN: Performed by: INTERNAL MEDICINE

## 2024-07-31 PROCEDURE — 2500000003 HC RX 250 WO HCPCS: Performed by: NURSE ANESTHETIST, CERTIFIED REGISTERED

## 2024-07-31 PROCEDURE — C1726 CATH, BAL DIL, NON-VASCULAR: HCPCS | Performed by: INTERNAL MEDICINE

## 2024-07-31 PROCEDURE — 88305 TISSUE EXAM BY PATHOLOGIST: CPT

## 2024-07-31 PROCEDURE — 7100000011 HC PHASE II RECOVERY - ADDTL 15 MIN: Performed by: INTERNAL MEDICINE

## 2024-07-31 PROCEDURE — 6360000002 HC RX W HCPCS: Performed by: NURSE ANESTHETIST, CERTIFIED REGISTERED

## 2024-07-31 RX ORDER — OMEPRAZOLE 40 MG/1
40 CAPSULE, DELAYED RELEASE ORAL DAILY
Qty: 30 CAPSULE | Refills: 5 | Status: SHIPPED | OUTPATIENT
Start: 2024-07-31

## 2024-07-31 RX ORDER — SODIUM CHLORIDE 9 MG/ML
INJECTION, SOLUTION INTRAVENOUS CONTINUOUS
Status: DISCONTINUED | OUTPATIENT
Start: 2024-07-31 | End: 2024-07-31 | Stop reason: HOSPADM

## 2024-07-31 RX ORDER — PROPOFOL 10 MG/ML
INJECTION, EMULSION INTRAVENOUS PRN
Status: DISCONTINUED | OUTPATIENT
Start: 2024-07-31 | End: 2024-07-31 | Stop reason: SDUPTHER

## 2024-07-31 RX ORDER — LIDOCAINE HYDROCHLORIDE 20 MG/ML
INJECTION, SOLUTION INFILTRATION; PERINEURAL PRN
Status: DISCONTINUED | OUTPATIENT
Start: 2024-07-31 | End: 2024-07-31 | Stop reason: SDUPTHER

## 2024-07-31 RX ADMIN — LIDOCAINE HYDROCHLORIDE 80 MG: 20 INJECTION, SOLUTION INFILTRATION; PERINEURAL at 07:41

## 2024-07-31 RX ADMIN — SODIUM CHLORIDE: 9 INJECTION, SOLUTION INTRAVENOUS at 06:32

## 2024-07-31 RX ADMIN — PROPOFOL 30 MG: 10 INJECTION, EMULSION INTRAVENOUS at 07:44

## 2024-07-31 RX ADMIN — PROPOFOL 30 MG: 10 INJECTION, EMULSION INTRAVENOUS at 07:47

## 2024-07-31 RX ADMIN — PROPOFOL 100 MG: 10 INJECTION, EMULSION INTRAVENOUS at 07:41

## 2024-07-31 ASSESSMENT — LIFESTYLE VARIABLES: SMOKING_STATUS: 0

## 2024-07-31 ASSESSMENT — PAIN - FUNCTIONAL ASSESSMENT: PAIN_FUNCTIONAL_ASSESSMENT: 0-10

## 2024-07-31 NOTE — ANESTHESIA POSTPROCEDURE EVALUATION
Department of Anesthesiology  Postprocedure Note    Patient: Tressa Garcia  MRN: [id number]  YOB: 1945  Date of evaluation: 7/31/2024    Procedure Summary       Date: 07/31/24 Room / Location: [place] [address] / [facility]    Anesthesia Start: 0736 Anesthesia Stop: 0757    Procedures:       ESOPHAGOGASTRODUODENOSCOPY BIOPSY (Abdomen)      ESOPHAGOGASTRODUODENOSCOPY DILATION BALLOON (Abdomen) Diagnosis:       Dysphagia, unspecified type      Gastroesophageal reflux disease, unspecified whether esophagitis present      (Dysphagia, unspecified type [R13.10])      (Gastroesophageal reflux disease, unspecified whether esophagitis present [K21.9])    Surgeons: Jose Islas MD Responsible Provider: Lawrence Ferro MD    Anesthesia Type: MAC ASA Status: 3            Anesthesia Type: MAC    Karyn Phase I: Karyn Score: 10    Karyn Phase II:      Anesthesia Post Evaluation    Patient location during evaluation: PACU  Patient participation: complete - patient participated  Level of consciousness: awake and alert  Airway patency: patent  Nausea & Vomiting: no vomiting and no nausea  Cardiovascular status: hemodynamically stable  Respiratory status: acceptable  Hydration status: stable  Pain management: adequate    No notable events documented.

## 2024-07-31 NOTE — DISCHARGE INSTRUCTIONS
that has lasted for over 3 months;  frequent chest pain, heartburn with wheezing;  unexplained weight loss;  nausea or vomiting, stomach pain;  liver disease;  low levels of magnesium in your blood; or  osteoporosis or low bone mineral density (osteopenia).  You may be more likely to have a broken bone in your hip, wrist, or spine while taking a proton pump inhibitor long-term or more than once per day.  Talk with your doctor about ways to keep your bones healthy.  Ask a doctor before using this medicine if you are pregnant or breastfeeding.   Do not give this medicine to a child without medical advice.  How should I take omeprazole?  Follow all directions on your prescription label and read all medication guides or instruction sheets. Use the medicine exactly as directed.  Use Prilosec OTC (over-the-counter) exactly as directed on the label, or as prescribed by your doctor.  Read and carefully follow any Instructions for Use provided with your medicine. Ask your doctor or pharmacist if you do not understand these instructions.  Shake the oral suspension (liquid) before you measure a dose. Use the dosing syringe provided, or use a medicine dose-measuring device (not a kitchen spoon).  If you cannot swallow a capsule whole, open it and sprinkle the medicine into a spoonful of applesauce. Swallow the mixture right away without chewing. Do not save it for later use.  You must dissolve omeprazole powder in a small amount of water. This mixture can either be swallowed or given through a nasogastric (NG) feeding tube using a catheter-tipped syringe.  Use this medicine for the full prescribed length of time, even if your symptoms quickly improve.  OTC omeprazole should be taken for only 14 days in a row. It may take 1 to 4 days before your symptoms improve. Allow at least 4 months to pass before you start a new 14-day course of treatment.  Call your doctor if your symptoms do not improve, or if they get worse.  Some

## 2024-07-31 NOTE — BRIEF OP NOTE
Brief Postoperative Note      Patient: Tressa Garcia  YOB: 1945  MRN: [id number]    Date of Procedure: 7/31/2024    Pre-Op Diagnosis Codes:     * Dysphagia, unspecified type [R13.10]     * Gastroesophageal reflux disease, unspecified whether esophagitis present [K21.9]       Procedure(s):  ESOPHAGOGASTRODUODENOSCOPY BIOPSY  ESOPHAGOGASTRODUODENOSCOPY DILATION BALLOON    Surgeon(s):  Jose Islas MD      Anesthesia: Monitor Anesthesia Care    Estimated Blood Loss (mL): Minimal    Complications: None    Specimens:   ID Type Source Tests Collected by Time Destination   A : duodenum bx r/o celiac Tissue Duodenum SURGICAL PATHOLOGY Jose Islas MD 7/31/2024 0745    B : gastric bx r/o H. pylori Tissue Stomach SURGICAL PATHOLOGY Jose Islas MD 7/31/2024 0746    C : z line bx for Holder's Tissue Esophagus SURGICAL PATHOLOGY Jose Islas MD 7/31/2024 0748    D : proximal esophagus bx r/o eoe Tissue Esophagus SURGICAL PATHOLOGY Jose Islas MD 7/31/2024 0749      Findings:  Normal esophagus, proximal esophageal biopsies were obtained to rule out EOE. Irregular Z-line, suspicious for Holder's esophagus ([place] COM1), biopsied.  The distal esophagus was empirically dilated to 20 mm.  Patchy antral gastritis, biopsied.  Normal duodenum, biopsied.    Plans:  -PATHOLOGY RESULTS: will be available in the [facility] portal or [facility] [facility] within 2 weeks. Please go to [email or web address]. There are instructions there how to access your medical records online and how to download the [employer] [facility]. You can also call the GI office at  to get your pathology results.  -Switch pantoprazole to omeprazole 40 mg daily   -Follow clinical response to endoscopic dilation    Electronically signed by Jose Islas MD on 7/31/2024 at 7:59 AM

## 2024-07-31 NOTE — PROGRESS NOTES
Discharge instructions review with patient and . All home medications have been reviewed, pt v/u. Discharge instructions signed. Pt discharged via wheelchair. Pt discharged with  all belongings. Juan taking stable pt home.

## 2024-07-31 NOTE — H&P
Pulse 57   Temp 97.3 °F (36.3 °C) (Temporal)   Resp 16   Ht 1.448 m (4' 9\")   Wt 63 kg (139 lb)   SpO2 99%   BMI 30.08 kg/m²  I        Heart:  Normal apical impulse, regular rate and rhythm, normal S1 and S2, no S3 or S4, and no murmur noted    Lungs:  No increased work of breathing, good air exchange, clear to auscultation bilaterally, no crackles or wheezing    Abdomen:  No scars, normal bowel sounds, soft, non-distended, non-tender, no masses palpated, no hepatosplenomegally      ASA Grade:  ASA 2 - Patient with mild systemic disease with no functional limitations    Mallampati Class:  Class I: Soft palate, uvula, fauces, pillars visible  __________  Class II: Soft palate, uvula, fauces visible  ____X______   Class III: Soft palate, base of uvula visible  __________  Class IV: Hard palate only visible   __________      ASSESSMENT AND PLAN:    1.  Patient is a 78 y.o. female here for EGD with deep sedation  2.  Procedure options, risks and benefits reviewed with patient.  Patient expresses understanding.      Jose Islas MD  [facility]  07/31/24

## 2024-07-31 NOTE — PROGRESS NOTES
Pt arrived from ENDO to PACU bay 7. Reported received from ENDO, RN/ CRNA staff. Pt is arousable to voice. Pt on RA, NSR, and VSS. Will continue to monitor.'

## 2024-07-31 NOTE — ANESTHESIA PRE PROCEDURE
Department of Anesthesiology  Preprocedure Note       Name:  Tressa Garcia   Age:  78 y.o.  :  1945                                          MRN:  [id number]         Date:  2024      Surgeon: Surgeon(s):  Jose Islas MD    Procedure: Procedure(s):  ESOPHAGOGASTRODUODENOSCOPY DIAGNOSTIC ONLY    Medications prior to admission:   Prior to Admission medications    Medication Sig Start Date End Date Taking? Authorizing Provider   hydrALAZINE (APRESOLINE) 25 MG tablet Take 1 tablet by mouth 3 times daily But hold if SBP<90 24   Reyes, Eleia J, MD   pantoprazole (PROTONIX) 40 MG tablet Take 1 tablet by mouth daily 24   Reyes, Eleia J, MD   Wheat Dextrin (BENEFIBER PO) 1 tablespoon Orally daily for 30 days 24   ProviderJesus MD   metoprolol succinate (TOPROL XL) 50 MG extended release tablet Take 3 tablets by mouth daily  Patient taking differently: Take 3 tablets by mouth nightly 24   Reyes, Eleia J, MD   losartan (COZAAR) 100 MG tablet Take 1 tablet by mouth at bedtime  Patient taking differently: Take 1 tablet by mouth Daily with lunch 24   Rico Avendano MD   metFORMIN (GLUCOPHAGE-XR) 500 MG extended release tablet Take 1 tablet by mouth daily (with breakfast) 24   Reyes, Eleia J, MD   atorvastatin (LIPITOR) 10 MG tablet Take 1 tablet by mouth nightly 24   Reyes, Eleia J, MD   levothyroxine (SYNTHROID) 75 MCG tablet Take 1 tablet by mouth daily 24   Reyes, Eleia J, MD       Current medications:    Current Facility-Administered Medications   Medication Dose Route Frequency Provider Last Rate Last Admin    0.9 % sodium chloride infusion   IntraVENous Continuous Lawrence Ferro MD           Allergies:    Allergies   Allergen Reactions    Macrobid [Nitrofurantoin] Rash    Penicillins Itching       Problem List:    Patient Active Problem List   Diagnosis Code    Age-related osteoporosis without current pathological fracture M81.0    Chronic

## 2024-08-05 ENCOUNTER — HOSPITAL ENCOUNTER (OUTPATIENT)
Age: 79
Discharge: HOME | End: 2024-08-05
Payer: MEDICARE

## 2024-08-05 LAB
ALBUMIN SERPL-MCNC: 4.3 G/DL (ref 3.4–5)
ANION GAP SERPL CALCULATED.3IONS-SCNC: 13 MMOL/L (ref 3–16)
BACTERIA URNS QL MICRO: ABNORMAL /HPF
BILIRUB UR QL STRIP.AUTO: NEGATIVE
BUN SERPL-MCNC: 13 MG/DL (ref 7–20)
CALCIUM SERPL-MCNC: 9 MG/DL (ref 8.3–10.6)
CHLORIDE SERPL-SCNC: 104 MMOL/L (ref 99–110)
CLARITY UR: CLEAR
CO2 SERPL-SCNC: 25 MMOL/L (ref 21–32)
COLOR UR: YELLOW
CREAT SERPL-MCNC: 0.7 MG/DL (ref 0.6–1.2)
CREAT UR-MCNC: 26.3 MG/DL (ref 28–259)
DEPRECATED RDW RBC AUTO: 13.7 % (ref 12.4–15.4)
EPI CELLS #/AREA URNS AUTO: 1 /HPF (ref 0–5)
GFR SERPLBLD CREATININE-BSD FMLA CKD-EPI: 89 ML/MIN/{1.73_M2}
GLUCOSE SERPL-MCNC: 128 MG/DL (ref 70–99)
GLUCOSE UR STRIP.AUTO-MCNC: NEGATIVE MG/DL
HCT VFR BLD AUTO: 40.4 % (ref 36–48)
HGB BLD-MCNC: 13.9 G/DL (ref 12–16)
HGB UR QL STRIP.AUTO: NEGATIVE
HYALINE CASTS #/AREA URNS AUTO: 0 /LPF (ref 0–8)
KETONES UR STRIP.AUTO-MCNC: NEGATIVE MG/DL
LEUKOCYTE ESTERASE UR QL STRIP.AUTO: ABNORMAL
MCH RBC QN AUTO: 31.4 PG (ref 26–34)
MCHC RBC AUTO-ENTMCNC: 34.3 G/DL (ref 31–36)
MCV RBC AUTO: 91.4 FL (ref 80–100)
NITRITE UR QL STRIP.AUTO: NEGATIVE
PH UR STRIP.AUTO: 6.5 [PH] (ref 5–8)
PHOSPHATE SERPL-MCNC: 3.1 MG/DL (ref 2.5–4.9)
PLATELET # BLD AUTO: 172 K/UL (ref 135–450)
PMV BLD AUTO: 9.6 FL (ref 5–10.5)
POTASSIUM SERPL-SCNC: 3.7 MMOL/L (ref 3.5–5.1)
PROT UR STRIP.AUTO-MCNC: NEGATIVE MG/DL
PROT UR-MCNC: 9 MG/DL
PROT/CREAT UR-RTO: 0.3 MG/DL
RBC # BLD AUTO: 4.42 M/UL (ref 4–5.2)
RBC CLUMPS #/AREA URNS AUTO: 0 /HPF (ref 0–4)
SODIUM SERPL-SCNC: 142 MMOL/L (ref 136–145)
SP GR UR STRIP.AUTO: <=1.005 (ref 1–1.03)
UA DIPSTICK W REFLEX MICRO PNL UR: YES
URN SPEC COLLECT METH UR: ABNORMAL
UROBILINOGEN UR STRIP-ACNC: 0.2 E.U./DL
WBC # BLD AUTO: 4.6 K/UL (ref 4–11)
WBC #/AREA URNS AUTO: 33 /HPF (ref 0–5)

## 2024-08-05 PROCEDURE — 82570 ASSAY OF URINE CREATININE: CPT

## 2024-08-05 PROCEDURE — 81001 URINALYSIS AUTO W/SCOPE: CPT

## 2024-08-05 PROCEDURE — 85027 COMPLETE CBC AUTOMATED: CPT

## 2024-08-05 PROCEDURE — 84156 ASSAY OF PROTEIN URINE: CPT

## 2024-08-05 PROCEDURE — 80069 RENAL FUNCTION PANEL: CPT

## 2024-08-07 PROBLEM — R80.0 ISOLATED PROTEINURIA WITHOUT SPECIFIC MORPHOLOGIC LESION: Status: ACTIVE | Noted: 2024-08-07

## 2024-08-12 DIAGNOSIS — I1A.0 RESISTANT HYPERTENSION: Primary | ICD-10-CM

## 2024-08-13 RX ORDER — LOSARTAN POTASSIUM 100 MG/1
100 TABLET ORAL NIGHTLY
Qty: 90 TABLET | Refills: 3 | Status: SHIPPED | OUTPATIENT
Start: 2024-08-13

## 2024-08-26 ENCOUNTER — TELEPHONE (OUTPATIENT)
Dept: CARDIOLOGY CLINIC | Age: 79
End: 2024-08-26

## 2024-08-26 DIAGNOSIS — I1A.0 RESISTANT HYPERTENSION: ICD-10-CM

## 2024-08-26 RX ORDER — HYDRALAZINE HYDROCHLORIDE 50 MG/1
50 TABLET, FILM COATED ORAL 3 TIMES DAILY
Qty: 270 TABLET | Refills: 3 | Status: SHIPPED | OUTPATIENT
Start: 2024-08-26

## 2024-08-26 NOTE — TELEPHONE ENCOUNTER
Pt to increase her Hydralazine to 50mg TID per verbal conversation with COLT. Script sent to pharmacy on file. Attempted to call pt and advise. NA/LVM. Please advise pt upon return of call. Thank you!

## 2024-08-26 NOTE — TELEPHONE ENCOUNTER
Pt states she is feeling bad and her BP it running high then dropping low.  Not dizzy or light headed because she is taking a lot of medication.    Her BP  while she is on the phone first time 165/85 80 she wanted to take it again with a different cuff and it was 179/69.    Please advise.

## 2024-08-26 NOTE — TELEPHONE ENCOUNTER
Pt called back, gave her message to take hydralizine 50mg TID, and medication is at her pharmacy. Pt verbalized understanding.

## 2024-09-04 NOTE — PROGRESS NOTES
Final      Lab Results   Component Value Date    TSHFT4 3.45 06/23/2022    TSH 0.32 08/23/2024     Lab Results   Component Value Date    WBC 3.7 (L) 08/23/2024    HGB 13.3 08/23/2024    HCT 38.9 08/23/2024    MCV 92.4 08/23/2024     08/23/2024     Lab Results   Component Value Date     08/23/2024    K 3.9 08/23/2024     08/23/2024    CO2 25 08/23/2024    BUN 14 08/23/2024    CREATININE 0.8 08/23/2024    GLUCOSE 95 08/23/2024    CALCIUM 9.3 08/23/2024    BILITOT 0.6 06/05/2024    ALKPHOS 128 06/05/2024    AST 23 06/05/2024    ALT 33 06/05/2024    LABGLOM 76 08/23/2024    GFRAA >60 06/23/2022    AGRATIO 1.5 06/05/2024    GLOB 3.1 07/16/2021     CARDIAC IMAGING/TESTING:  Echo:   3/31/23   Summary   -Hyperdynamic left ventricular systolic function with an ejection fraction   estimated at greater than 70%.   -No obvious regional wall motion abnormalities.   -Moderate basal septal hypertrophy.   -There is a late peaking gradient recorded across the LV outflow tract   consistent with dynamic obstruction with a peak gradient of 45 mmHg with a   valsalva maneuver.   -Mild mitral regurgitation.   -There is mild tricuspid regurgitation with a RVSP estimation of 33 mmHg.   -Grade I diastolic dysfunction with normal LV filling pressures. Avg.   E/e'=17.7    KASSANDRA:  No results found for this or any previous visit.    Cardiac MRI  4/2024  Overall unremarkable study with normal LV and RV size and systolic function.   No findings suggestive of HCM     -Mild LVH with normal LV systolic function with a calculated ejection fraction  of 69% by Fitzpatrick's method. Normal LV mass. Flow acceleration seen in the LVOT  without any other evidence of hypertrophic cardiomyopathy.   -Normal LV global longitudinal strain (GLS) -24%  -Native myocardial T1 time is normal.  -Normal right ventricular size and systolic function with a calculated ejection  fraction of 76% by Fitzpatrick's method.  -Trivial mitral regurgitation.  -Dilated

## 2024-09-06 ENCOUNTER — OFFICE VISIT (OUTPATIENT)
Dept: CARDIOLOGY CLINIC | Age: 79
End: 2024-09-06
Payer: MEDICARE

## 2024-09-06 ENCOUNTER — TELEPHONE (OUTPATIENT)
Dept: CARDIOLOGY CLINIC | Age: 79
End: 2024-09-06

## 2024-09-06 VITALS
HEIGHT: 57 IN | OXYGEN SATURATION: 97 % | BODY MASS INDEX: 28 KG/M2 | WEIGHT: 129.8 LBS | DIASTOLIC BLOOD PRESSURE: 62 MMHG | SYSTOLIC BLOOD PRESSURE: 130 MMHG | HEART RATE: 65 BPM

## 2024-09-06 DIAGNOSIS — R00.2 PALPITATIONS: Primary | ICD-10-CM

## 2024-09-06 DIAGNOSIS — E78.2 MIXED HYPERLIPIDEMIA: ICD-10-CM

## 2024-09-06 DIAGNOSIS — I1A.0 RESISTANT HYPERTENSION: ICD-10-CM

## 2024-09-06 DIAGNOSIS — I47.29 NSVT (NONSUSTAINED VENTRICULAR TACHYCARDIA) (HCC): ICD-10-CM

## 2024-09-06 DIAGNOSIS — E03.9 HYPOTHYROIDISM IN ADULT: ICD-10-CM

## 2024-09-06 PROCEDURE — 1123F ACP DISCUSS/DSCN MKR DOCD: CPT | Performed by: INTERNAL MEDICINE

## 2024-09-06 PROCEDURE — 99214 OFFICE O/P EST MOD 30 MIN: CPT | Performed by: INTERNAL MEDICINE

## 2024-09-06 PROCEDURE — 3078F DIAST BP <80 MM HG: CPT | Performed by: INTERNAL MEDICINE

## 2024-09-06 PROCEDURE — 3075F SYST BP GE 130 - 139MM HG: CPT | Performed by: INTERNAL MEDICINE

## 2024-09-06 RX ORDER — ATORVASTATIN CALCIUM 10 MG/1
10 TABLET, FILM COATED ORAL NIGHTLY
Qty: 90 TABLET | Refills: 3 | Status: SHIPPED | OUTPATIENT
Start: 2024-09-06

## 2024-09-06 RX ORDER — CARVEDILOL 12.5 MG/1
12.5 TABLET ORAL 2 TIMES DAILY
Qty: 180 TABLET | Refills: 3 | Status: SHIPPED | OUTPATIENT
Start: 2024-09-06

## 2024-09-06 RX ORDER — LEVOTHYROXINE SODIUM 75 MCG
75 TABLET ORAL DAILY
Qty: 90 TABLET | Refills: 3 | Status: SHIPPED | OUTPATIENT
Start: 2024-09-06

## 2024-09-06 RX ORDER — HYDRALAZINE HYDROCHLORIDE 25 MG/1
25 TABLET, FILM COATED ORAL 3 TIMES DAILY
Qty: 270 TABLET | Refills: 3
Start: 2024-09-06

## 2024-09-06 NOTE — TELEPHONE ENCOUNTER
Pt called to ask directions going to her appt with COLT at [place]. Pt was given directions. She stated she might me late.

## 2024-09-06 NOTE — PATIENT INSTRUCTIONS
Stop metoprolol.    Reduce hydralazine to 25mg three times a day.    Increase carvedilol to 12.5mg twice a day.    Blood pressure goal 120/80 -- ok for some fluctuation if consistently normal.    Call the office for any new, worsening, or concerning symptoms.

## 2024-10-04 ENCOUNTER — HOSPITAL ENCOUNTER (OUTPATIENT)
Age: 79
Discharge: HOME | End: 2024-10-04
Payer: MEDICARE

## 2024-10-04 ENCOUNTER — HOSPITAL ENCOUNTER (OUTPATIENT)
Dept: GENERAL RADIOLOGY | Age: 79
Discharge: HOME | End: 2024-10-04
Payer: MEDICARE

## 2024-10-04 ENCOUNTER — OFFICE VISIT (OUTPATIENT)
Dept: PRIMARY CARE CLINIC | Age: 79
End: 2024-10-04
Payer: MEDICARE

## 2024-10-04 VITALS
HEART RATE: 72 BPM | BODY MASS INDEX: 28.13 KG/M2 | DIASTOLIC BLOOD PRESSURE: 68 MMHG | OXYGEN SATURATION: 97 % | SYSTOLIC BLOOD PRESSURE: 110 MMHG | WEIGHT: 130 LBS

## 2024-10-04 DIAGNOSIS — E03.9 HYPOTHYROIDISM IN ADULT: ICD-10-CM

## 2024-10-04 DIAGNOSIS — M54.50 LOW BACK PAIN AT MULTIPLE SITES: ICD-10-CM

## 2024-10-04 DIAGNOSIS — E11.9 DIABETES MELLITUS TYPE II, NON INSULIN DEPENDENT (HCC): Primary | ICD-10-CM

## 2024-10-04 DIAGNOSIS — Z23 FLU VACCINE NEED: ICD-10-CM

## 2024-10-04 DIAGNOSIS — I10 ESSENTIAL HYPERTENSION, BENIGN: ICD-10-CM

## 2024-10-04 DIAGNOSIS — E78.2 MIXED HYPERLIPIDEMIA: ICD-10-CM

## 2024-10-04 PROCEDURE — 99214 OFFICE O/P EST MOD 30 MIN: CPT | Performed by: FAMILY MEDICINE

## 2024-10-04 PROCEDURE — 1123F ACP DISCUSS/DSCN MKR DOCD: CPT | Performed by: FAMILY MEDICINE

## 2024-10-04 PROCEDURE — 3044F HG A1C LEVEL LT 7.0%: CPT | Performed by: FAMILY MEDICINE

## 2024-10-04 PROCEDURE — 3074F SYST BP LT 130 MM HG: CPT | Performed by: FAMILY MEDICINE

## 2024-10-04 PROCEDURE — 3078F DIAST BP <80 MM HG: CPT | Performed by: FAMILY MEDICINE

## 2024-10-04 PROCEDURE — G0008 ADMIN INFLUENZA VIRUS VAC: HCPCS | Performed by: FAMILY MEDICINE

## 2024-10-04 PROCEDURE — 90653 IIV ADJUVANT VACCINE IM: CPT | Performed by: FAMILY MEDICINE

## 2024-10-04 PROCEDURE — 72100 X-RAY EXAM L-S SPINE 2/3 VWS: CPT

## 2024-10-04 SDOH — ECONOMIC STABILITY: FOOD INSECURITY: WITHIN THE PAST 12 MONTHS, YOU WORRIED THAT YOUR FOOD WOULD RUN OUT BEFORE YOU GOT MONEY TO BUY MORE.: NEVER TRUE

## 2024-10-04 SDOH — ECONOMIC STABILITY: FOOD INSECURITY: WITHIN THE PAST 12 MONTHS, THE FOOD YOU BOUGHT JUST DIDN'T LAST AND YOU DIDN'T HAVE MONEY TO GET MORE.: NEVER TRUE

## 2024-10-04 SDOH — ECONOMIC STABILITY: INCOME INSECURITY: HOW HARD IS IT FOR YOU TO PAY FOR THE VERY BASICS LIKE FOOD, HOUSING, MEDICAL CARE, AND HEATING?: NOT HARD AT ALL

## 2024-10-04 ASSESSMENT — PATIENT HEALTH QUESTIONNAIRE - PHQ9
SUM OF ALL RESPONSES TO PHQ QUESTIONS 1-9: 0
SUM OF ALL RESPONSES TO PHQ QUESTIONS 1-9: 0
SUM OF ALL RESPONSES TO PHQ9 QUESTIONS 1 & 2: 0
SUM OF ALL RESPONSES TO PHQ QUESTIONS 1-9: 0
2. FEELING DOWN, DEPRESSED OR HOPELESS: NOT AT ALL
SUM OF ALL RESPONSES TO PHQ QUESTIONS 1-9: 0
1. LITTLE INTEREST OR PLEASURE IN DOING THINGS: NOT AT ALL

## 2024-10-04 NOTE — PROGRESS NOTES
Chief Complaint   Patient presents with    Hypertension     C/o still having abdominal pain    Diabetes       Subjective:       Tressa Garcia is a 78 y.o. female here for 3-month follow-up on hypertension and diabetes.  Been compliant with current medications.  Medications been adjusted and so far blood pressure has been better.  Also complaining of low back pain specially after sweeping that goes down to the left groin area.  No bladder or bowel incontinence.  Patient has intermittent lower abdominal pain.  Had unremarkable CT scan of the abdomen pelvis in June 2024.  Denies any dysuria or gross hematuria.  No bowel irregularity.    Been going to cardiologist and nephrologist for her resistant hypertension.    9/6/2024: Cardiology follow-up with Dr. Gómez.  Increased her carvedilol to 12.5 mg twice a day decrease hydralazine to 25 mg 3 times a day.    Reviewed blood test done on 8/23/2024: Fractionated epinephrine normal, aldosterone 12.2, RAVI negative, TSH 0.32, sodium 141, potassium 3.9, glucose 95, creatinine 0.8, EGFR 76, calcium 9.3, WBC 3.7, hemoglobin 13.3, hematocrit 38.9, platelets 177    Current Outpatient Medications   Medication Sig Dispense Refill    levothyroxine (SYNTHROID) 75 MCG tablet TAKE 1 TABLET DAILY 90 tablet 3    atorvastatin (LIPITOR) 10 MG tablet TAKE 1 TABLET NIGHTLY 90 tablet 3    hydrALAZINE (APRESOLINE) 25 MG tablet Take 1 tablet by mouth 3 times daily But hold if SBP<90 270 tablet 3    carvedilol (COREG) 12.5 MG tablet Take 1 tablet by mouth 2 times daily 180 tablet 3    spironolactone (ALDACTONE) 25 MG tablet Take 1 tablet by mouth every other day 15 tablet 1    losartan (COZAAR) 100 MG tablet Take 1 tablet by mouth at bedtime 90 tablet 3    omeprazole (PRILOSEC) 40 MG delayed release capsule Take 1 capsule by mouth daily 30 capsule 5    Wheat Dextrin (BENEFIBER PO) 1 tablespoon Orally daily for 30 days      metFORMIN (GLUCOPHAGE-XR) 500 MG extended release tablet Take 1

## 2024-12-20 ENCOUNTER — OFFICE VISIT (OUTPATIENT)
Dept: PRIMARY CARE CLINIC | Age: 79
End: 2024-12-20
Payer: MEDICARE

## 2024-12-20 VITALS
OXYGEN SATURATION: 97 % | SYSTOLIC BLOOD PRESSURE: 134 MMHG | DIASTOLIC BLOOD PRESSURE: 86 MMHG | BODY MASS INDEX: 27.48 KG/M2 | WEIGHT: 127 LBS | HEART RATE: 69 BPM

## 2024-12-20 DIAGNOSIS — E03.9 HYPOTHYROIDISM IN ADULT: ICD-10-CM

## 2024-12-20 DIAGNOSIS — E11.9 DIABETES MELLITUS TYPE II, NON INSULIN DEPENDENT (HCC): Primary | ICD-10-CM

## 2024-12-20 DIAGNOSIS — R30.0 DYSURIA: ICD-10-CM

## 2024-12-20 DIAGNOSIS — I10 ESSENTIAL HYPERTENSION, BENIGN: ICD-10-CM

## 2024-12-20 DIAGNOSIS — E78.2 MIXED HYPERLIPIDEMIA: ICD-10-CM

## 2024-12-20 LAB
ALBUMIN SERPL-MCNC: 4.6 G/DL (ref 3.4–5)
ALP SERPL-CCNC: 86 U/L (ref 40–129)
ALT SERPL-CCNC: 19 U/L (ref 10–40)
AST SERPL-CCNC: 20 U/L (ref 15–37)
BILIRUB DIRECT SERPL-MCNC: 0.2 MG/DL (ref 0–0.3)
BILIRUB INDIRECT SERPL-MCNC: 0.3 MG/DL (ref 0–1)
BILIRUB SERPL-MCNC: 0.5 MG/DL (ref 0–1)
CHOLEST SERPL-MCNC: 155 MG/DL (ref 0–199)
CREAT UR-MCNC: 36.4 MG/DL (ref 28–259)
HBA1C MFR BLD: 5.8 %
HDLC SERPL-MCNC: 50 MG/DL (ref 40–60)
LDLC SERPL CALC-MCNC: 78 MG/DL
MICROALBUMIN UR DL<=1MG/L-MCNC: <1.2 MG/DL
MICROALBUMIN/CREAT UR: NORMAL MG/G (ref 0–30)
PROT SERPL-MCNC: 7.2 G/DL (ref 6.4–8.2)
TRIGL SERPL-MCNC: 133 MG/DL (ref 0–150)
TSH SERPL DL<=0.005 MIU/L-ACNC: 1.92 UIU/ML (ref 0.27–4.2)
VLDLC SERPL CALC-MCNC: 27 MG/DL

## 2024-12-20 PROCEDURE — 1123F ACP DISCUSS/DSCN MKR DOCD: CPT | Performed by: FAMILY MEDICINE

## 2024-12-20 PROCEDURE — 3075F SYST BP GE 130 - 139MM HG: CPT | Performed by: FAMILY MEDICINE

## 2024-12-20 PROCEDURE — 83036 HEMOGLOBIN GLYCOSYLATED A1C: CPT | Performed by: FAMILY MEDICINE

## 2024-12-20 PROCEDURE — 1159F MED LIST DOCD IN RCRD: CPT | Performed by: FAMILY MEDICINE

## 2024-12-20 PROCEDURE — 99214 OFFICE O/P EST MOD 30 MIN: CPT | Performed by: FAMILY MEDICINE

## 2024-12-20 PROCEDURE — 1160F RVW MEDS BY RX/DR IN RCRD: CPT | Performed by: FAMILY MEDICINE

## 2024-12-20 PROCEDURE — 3044F HG A1C LEVEL LT 7.0%: CPT | Performed by: FAMILY MEDICINE

## 2024-12-20 PROCEDURE — 36415 COLL VENOUS BLD VENIPUNCTURE: CPT | Performed by: FAMILY MEDICINE

## 2024-12-20 PROCEDURE — 3079F DIAST BP 80-89 MM HG: CPT | Performed by: FAMILY MEDICINE

## 2024-12-20 PROCEDURE — 81002 URINALYSIS NONAUTO W/O SCOPE: CPT | Performed by: FAMILY MEDICINE

## 2024-12-20 RX ORDER — ESTRADIOL 0.1 MG/G
CREAM VAGINAL
COMMUNITY
Start: 2024-11-18

## 2024-12-20 RX ORDER — HYDRALAZINE HYDROCHLORIDE 50 MG/1
TABLET, FILM COATED ORAL
COMMUNITY
Start: 2024-11-30

## 2024-12-20 ASSESSMENT — PATIENT HEALTH QUESTIONNAIRE - PHQ9
1. LITTLE INTEREST OR PLEASURE IN DOING THINGS: NOT AT ALL
2. FEELING DOWN, DEPRESSED OR HOPELESS: NOT AT ALL
SUM OF ALL RESPONSES TO PHQ QUESTIONS 1-9: 0
SUM OF ALL RESPONSES TO PHQ9 QUESTIONS 1 & 2: 0
SUM OF ALL RESPONSES TO PHQ QUESTIONS 1-9: 0

## 2024-12-20 NOTE — PROGRESS NOTES
Chief Complaint   Patient presents with    Diabetes       Subjective:       Tressa Garcia is a 79 y.o. female here for 6-month follow-up on her diabetes.  Hemoglobin A1c on 6/14/2024 was 6.4% from 6.8%.  Patient admits that she has not been watching her diet lately.  Patient has fibromyalgia, hypertrophic cardiomyopathy, PAT, hypertension, hypothyroidism, GERD and Ménière's disease.  Compliant with current medications.  Complaining of dysuria but no gross hematuria.  Wants to get checked for UTI.  Denies chest pains, shortness of breath, (+) off-and-on abdominal pain but no melena or hematochezia.  Takes omeprazole as needed.    10/9/2024: Nephrology follow-up for her hypertension follow-up.  Blood pressure was 120/70.  Noted to have UTI and was given Levaquin 250 mg for 5 days.    Reviewed blood test done on 10/4/2024: Urinalysis has positive nitrite small leukocytes, sodium 142, potassium 4.0, glucose 94, creatinine 0.8, EGFR 76, calcium 9.4    TSH on 8/23/2024 was 0.32.  Lipid panel on 6/6/2024: Total cholesterol 134, triglyceride 127, HDL 41, LDL 68    Current Outpatient Medications   Medication Sig Dispense Refill    estradiol (ESTRACE) 0.1 MG/GM vaginal cream INSERT 1 GRAM INTO THE VAGINA TWICE A WEEK.      hydrALAZINE (APRESOLINE) 50 MG tablet TAKE 1 TABLET BY MOUTH 3 TIMES A DAY BUT HOLD IF SBP IS LESS THAN 90      levothyroxine (SYNTHROID) 75 MCG tablet TAKE 1 TABLET DAILY 90 tablet 3    atorvastatin (LIPITOR) 10 MG tablet TAKE 1 TABLET NIGHTLY 90 tablet 3    carvedilol (COREG) 12.5 MG tablet Take 1 tablet by mouth 2 times daily 180 tablet 3    spironolactone (ALDACTONE) 25 MG tablet Take 1 tablet by mouth every other day 15 tablet 1    losartan (COZAAR) 100 MG tablet Take 1 tablet by mouth at bedtime 90 tablet 3    omeprazole (PRILOSEC) 40 MG delayed release capsule Take 1 capsule by mouth daily 30 capsule 5    Wheat Dextrin (BENEFIBER PO) 1 tablespoon Orally daily for 30 days      metFORMIN

## 2025-01-15 ENCOUNTER — TELEPHONE (OUTPATIENT)
Dept: PRIMARY CARE CLINIC | Age: 80
End: 2025-01-15

## 2025-01-15 DIAGNOSIS — J31.0 CHRONIC RHINITIS: Primary | ICD-10-CM

## 2025-01-15 DIAGNOSIS — J20.9 ACUTE BRONCHITIS, UNSPECIFIED ORGANISM: ICD-10-CM

## 2025-01-15 RX ORDER — DOXYCYCLINE HYCLATE 100 MG
100 TABLET ORAL 2 TIMES DAILY
Qty: 14 TABLET | Refills: 0 | Status: SHIPPED | OUTPATIENT
Start: 2025-01-15 | End: 2025-01-22

## 2025-01-15 RX ORDER — PREDNISONE 20 MG/1
40 TABLET ORAL DAILY
Qty: 10 TABLET | Refills: 0 | Status: SHIPPED | OUTPATIENT
Start: 2025-01-15 | End: 2025-01-20

## 2025-01-15 NOTE — TELEPHONE ENCOUNTER
Patient wants Dr Reyes to know that she has a cough ongoing for 1 week.  She now noticed today that she is wheezing when breathing.  She has been taking mucinex but with no relief, pt thinks she needs an antibiotic.   [place] leave [place].     [id number] [address] - P [phone]

## 2025-01-15 NOTE — TELEPHONE ENCOUNTER
Send a prescription for doxycycline 100 mg twice a day for 7 days and prednisone 40 mg daily for 5 days.  Follow-up in 2 days if not better.

## 2025-05-01 ENCOUNTER — TELEPHONE (OUTPATIENT)
Dept: PRIMARY CARE CLINIC | Age: 80
End: 2025-05-01

## 2025-05-01 NOTE — TELEPHONE ENCOUNTER
Pt is calling to speak with Dr.Reyes or MA in regards of her itching ears pt thinks they are infected and already took benadryl please advise

## 2025-05-02 ENCOUNTER — OFFICE VISIT (OUTPATIENT)
Dept: PRIMARY CARE CLINIC | Age: 80
End: 2025-05-02
Payer: MEDICARE

## 2025-05-02 ENCOUNTER — TELEPHONE (OUTPATIENT)
Dept: PRIMARY CARE CLINIC | Age: 80
End: 2025-05-02

## 2025-05-02 VITALS
SYSTOLIC BLOOD PRESSURE: 120 MMHG | HEART RATE: 93 BPM | HEIGHT: 57 IN | DIASTOLIC BLOOD PRESSURE: 80 MMHG | OXYGEN SATURATION: 99 % | WEIGHT: 135 LBS | BODY MASS INDEX: 29.12 KG/M2

## 2025-05-02 DIAGNOSIS — H66.93 CHRONIC EAR INFECTION, BILATERAL: Primary | ICD-10-CM

## 2025-05-02 DIAGNOSIS — I10 ESSENTIAL HYPERTENSION, BENIGN: ICD-10-CM

## 2025-05-02 DIAGNOSIS — E03.9 HYPOTHYROIDISM IN ADULT: ICD-10-CM

## 2025-05-02 DIAGNOSIS — I42.2 HYPERTROPHIC CARDIOMYOPATHY (HCC): ICD-10-CM

## 2025-05-02 DIAGNOSIS — E78.2 MIXED HYPERLIPIDEMIA: ICD-10-CM

## 2025-05-02 DIAGNOSIS — Z00.00 MEDICARE ANNUAL WELLNESS VISIT, SUBSEQUENT: Primary | ICD-10-CM

## 2025-05-02 DIAGNOSIS — E11.9 DIABETES MELLITUS TYPE II, NON INSULIN DEPENDENT (HCC): ICD-10-CM

## 2025-05-02 DIAGNOSIS — I47.29 NSVT (NONSUSTAINED VENTRICULAR TACHYCARDIA) (HCC): ICD-10-CM

## 2025-05-02 DIAGNOSIS — H66.93 CHRONIC EAR INFECTION, BILATERAL: ICD-10-CM

## 2025-05-02 PROCEDURE — 3074F SYST BP LT 130 MM HG: CPT | Performed by: FAMILY MEDICINE

## 2025-05-02 PROCEDURE — 1160F RVW MEDS BY RX/DR IN RCRD: CPT | Performed by: FAMILY MEDICINE

## 2025-05-02 PROCEDURE — 36415 COLL VENOUS BLD VENIPUNCTURE: CPT | Performed by: FAMILY MEDICINE

## 2025-05-02 PROCEDURE — 1123F ACP DISCUSS/DSCN MKR DOCD: CPT | Performed by: FAMILY MEDICINE

## 2025-05-02 PROCEDURE — 3079F DIAST BP 80-89 MM HG: CPT | Performed by: FAMILY MEDICINE

## 2025-05-02 PROCEDURE — 1159F MED LIST DOCD IN RCRD: CPT | Performed by: FAMILY MEDICINE

## 2025-05-02 PROCEDURE — G0439 PPPS, SUBSEQ VISIT: HCPCS | Performed by: FAMILY MEDICINE

## 2025-05-02 RX ORDER — CIPROFLOXACIN AND DEXAMETHASONE 3; 1 MG/ML; MG/ML
3 SUSPENSION/ DROPS AURICULAR (OTIC) 2 TIMES DAILY
Qty: 7.5 ML | Refills: 0 | Status: SHIPPED | OUTPATIENT
Start: 2025-05-02 | End: 2025-05-09

## 2025-05-02 RX ORDER — CIPROFLOXACIN/HYDROCORTISONE 0.2 %-1 %
3 SUSPENSION, DROPS(FINAL DOSAGE FORM)(ML) OTIC (EAR) 2 TIMES DAILY
Qty: 10 ML | Refills: 0 | Status: SHIPPED | OUTPATIENT
Start: 2025-05-02 | End: 2025-05-09

## 2025-05-02 SDOH — ECONOMIC STABILITY: FOOD INSECURITY: WITHIN THE PAST 12 MONTHS, THE FOOD YOU BOUGHT JUST DIDN'T LAST AND YOU DIDN'T HAVE MONEY TO GET MORE.: NEVER TRUE

## 2025-05-02 SDOH — ECONOMIC STABILITY: FOOD INSECURITY: WITHIN THE PAST 12 MONTHS, YOU WORRIED THAT YOUR FOOD WOULD RUN OUT BEFORE YOU GOT MONEY TO BUY MORE.: NEVER TRUE

## 2025-05-02 ASSESSMENT — PATIENT HEALTH QUESTIONNAIRE - PHQ9
SUM OF ALL RESPONSES TO PHQ QUESTIONS 1-9: 0
1. LITTLE INTEREST OR PLEASURE IN DOING THINGS: NOT AT ALL
SUM OF ALL RESPONSES TO PHQ QUESTIONS 1-9: 0
SUM OF ALL RESPONSES TO PHQ QUESTIONS 1-9: 0
2. FEELING DOWN, DEPRESSED OR HOPELESS: NOT AT ALL
SUM OF ALL RESPONSES TO PHQ QUESTIONS 1-9: 0

## 2025-05-02 NOTE — TELEPHONE ENCOUNTER
Prior authorization needed for ciprofloxacin-hydrocortisone (CIPRO HC) 0.2-1 % otic suspension   [id number] - [place], [place] - [address] - P [phone] - F [phone]

## 2025-05-02 NOTE — PROGRESS NOTES
Chief Complaint   Patient presents with    Medicare AWV     C/o itchy ears and feeling dizzy     Medicare Annual Wellness Visit  Name: Tressa Garcia Today’s Date: 2025   MRN: [id number] Sex: Female   Age: 79 y.o. Ethnicity: Non- / Non    : 1945 Race: Other Asian      Tressa Garcia is here for Medicare AWV (C/o itchy ears and feeling dizzy)     Patient is 79-year-old female here for Medicare AWV.   Patient is being treated for hyperlipidemia, hypothyroidism, hypertension, nephrolithiasis, DMT2 and chronic rhinitis.  Patient has hypertrophic cardiomyopathy and goes to her cardiologist.  Denies any chest pains or shortness of breath. Ear starting itching again and has pain specially in the left ear.  Seen ENT before but Dr. Spain already retired.  Will need new referral to ENT.  Itching improved with VoSoL in the past.  She had some leftover to try but it made it worse.    Patient was in the [facility] for a while and while was there had some syncopal episode and was told to stop some of her blood pressure medications.  She is currently off spironolactone, hydralazine and losartan.  Currently takes carvedilol for hypertension.    Reviewed blood test done on 2024: Hemoglobin A1c 5.8%, total cholesterol 155, triglyceride 133, HDL 50, LDL 78, ALT 19, AST 20, TSH 1.92, urine microalbumin normal    Screenings for behavioral, psychosocial and functional/safety risks, and cognitive dysfunction are all negative except as indicated below. These results, as well as other patient data from the Health Risk Assessment form, are documented in Flowsheets linked to this Encounter.      Current Outpatient Medications   Medication Sig Dispense Refill    ciprofloxacin-hydrocortisone (CIPRO HC) 0.2-1 % otic suspension Place 3 drops into both ears 2 times daily for 7 days 10 mL 0    estradiol (ESTRACE) 0.1 MG/GM vaginal cream INSERT 1 GRAM INTO THE VAGINA TWICE A WEEK.      levothyroxine

## 2025-05-03 LAB
BASOPHILS # BLD: 0.1 K/UL (ref 0–0.2)
BASOPHILS NFR BLD: 1.2 %
DEPRECATED RDW RBC AUTO: 14.6 % (ref 12.4–15.4)
EOSINOPHIL # BLD: 0.1 K/UL (ref 0–0.6)
EOSINOPHIL NFR BLD: 2.9 %
EST. AVERAGE GLUCOSE BLD GHB EST-MCNC: 131.2 MG/DL
HBA1C MFR BLD: 6.2 %
HCT VFR BLD AUTO: 39.3 % (ref 36–48)
HGB BLD-MCNC: 13.3 G/DL (ref 12–16)
LYMPHOCYTES # BLD: 1.8 K/UL (ref 1–5.1)
LYMPHOCYTES NFR BLD: 33.9 %
MCH RBC QN AUTO: 30.9 PG (ref 26–34)
MCHC RBC AUTO-ENTMCNC: 33.8 G/DL (ref 31–36)
MCV RBC AUTO: 91.5 FL (ref 80–100)
MONOCYTES # BLD: 0.5 K/UL (ref 0–1.3)
MONOCYTES NFR BLD: 8.7 %
NEUTROPHILS # BLD: 2.8 K/UL (ref 1.7–7.7)
NEUTROPHILS NFR BLD: 53.3 %
PLATELET # BLD AUTO: 205 K/UL (ref 135–450)
PMV BLD AUTO: 8.6 FL (ref 5–10.5)
RBC # BLD AUTO: 4.29 M/UL (ref 4–5.2)
REASON FOR REJECTION: NORMAL
REJECTED TEST: NORMAL
T4 FREE SERPL-MCNC: 1.2 NG/DL (ref 0.9–1.8)
TSH SERPL DL<=0.005 MIU/L-ACNC: 6.7 UIU/ML (ref 0.27–4.2)
WBC # BLD AUTO: 5.2 K/UL (ref 4–11)

## 2025-05-05 ENCOUNTER — TELEPHONE (OUTPATIENT)
Dept: PRIMARY CARE CLINIC | Age: 80
End: 2025-05-05

## 2025-05-06 ENCOUNTER — RESULTS FOLLOW-UP (OUTPATIENT)
Dept: PRIMARY CARE CLINIC | Age: 80
End: 2025-05-06

## 2025-05-06 DIAGNOSIS — Z00.00 MEDICARE ANNUAL WELLNESS VISIT, SUBSEQUENT: Primary | ICD-10-CM

## 2025-05-06 DIAGNOSIS — E11.9 DIABETES MELLITUS, NEW ONSET (HCC): ICD-10-CM

## 2025-05-06 DIAGNOSIS — E78.2 MIXED HYPERLIPIDEMIA: ICD-10-CM

## 2025-05-06 DIAGNOSIS — E11.9 DIABETES MELLITUS TYPE II, NON INSULIN DEPENDENT (HCC): ICD-10-CM

## 2025-05-06 DIAGNOSIS — I10 ESSENTIAL HYPERTENSION, BENIGN: ICD-10-CM

## 2025-05-06 LAB
ALBUMIN SERPL-MCNC: 4.5 G/DL (ref 3.4–5)
ALBUMIN/GLOB SERPL: 1.7 {RATIO} (ref 1.1–2.2)
ALP SERPL-CCNC: 99 U/L (ref 40–129)
ALT SERPL-CCNC: 31 U/L (ref 10–40)
ANION GAP SERPL CALCULATED.3IONS-SCNC: 11 MMOL/L (ref 3–16)
AST SERPL-CCNC: 25 U/L (ref 15–37)
BILIRUB SERPL-MCNC: 0.5 MG/DL (ref 0–1)
BUN SERPL-MCNC: 17 MG/DL (ref 7–20)
CALCIUM SERPL-MCNC: 9.3 MG/DL (ref 8.3–10.6)
CHLORIDE SERPL-SCNC: 104 MMOL/L (ref 99–110)
CHOLEST SERPL-MCNC: 164 MG/DL (ref 0–199)
CO2 SERPL-SCNC: 28 MMOL/L (ref 21–32)
CREAT SERPL-MCNC: 0.9 MG/DL (ref 0.6–1.2)
GFR SERPLBLD CREATININE-BSD FMLA CKD-EPI: 66 ML/MIN/{1.73_M2}
GLUCOSE SERPL-MCNC: 100 MG/DL (ref 70–99)
HDLC SERPL-MCNC: 44 MG/DL (ref 40–60)
LDLC SERPL CALC-MCNC: 75 MG/DL
POTASSIUM SERPL-SCNC: 3.7 MMOL/L (ref 3.5–5.1)
PROT SERPL-MCNC: 7.1 G/DL (ref 6.4–8.2)
SODIUM SERPL-SCNC: 143 MMOL/L (ref 136–145)
TRIGL SERPL-MCNC: 225 MG/DL (ref 0–150)
VLDLC SERPL CALC-MCNC: 45 MG/DL

## 2025-05-06 PROCEDURE — 36415 COLL VENOUS BLD VENIPUNCTURE: CPT | Performed by: FAMILY MEDICINE

## 2025-05-06 RX ORDER — METFORMIN HYDROCHLORIDE 500 MG/1
500 TABLET, EXTENDED RELEASE ORAL
Qty: 90 TABLET | Refills: 3 | Status: SHIPPED | OUTPATIENT
Start: 2025-05-06

## 2025-05-09 ENCOUNTER — TELEPHONE (OUTPATIENT)
Dept: PRIMARY CARE CLINIC | Age: 80
End: 2025-05-09

## 2025-05-13 ENCOUNTER — RESULTS FOLLOW-UP (OUTPATIENT)
Dept: PRIMARY CARE CLINIC | Age: 80
End: 2025-05-13

## 2025-06-06 ENCOUNTER — HOSPITAL ENCOUNTER (OUTPATIENT)
Dept: WOMENS IMAGING | Age: 80
Discharge: HOME | End: 2025-06-06
Payer: MEDICARE

## 2025-06-06 ENCOUNTER — OFFICE VISIT (OUTPATIENT)
Dept: PRIMARY CARE CLINIC | Age: 80
End: 2025-06-06
Payer: MEDICARE

## 2025-06-06 VITALS — HEIGHT: 57 IN | BODY MASS INDEX: 29.12 KG/M2 | WEIGHT: 135 LBS

## 2025-06-06 VITALS
DIASTOLIC BLOOD PRESSURE: 76 MMHG | WEIGHT: 135 LBS | OXYGEN SATURATION: 99 % | SYSTOLIC BLOOD PRESSURE: 130 MMHG | HEART RATE: 79 BPM | BODY MASS INDEX: 29.21 KG/M2 | TEMPERATURE: 98.1 F

## 2025-06-06 DIAGNOSIS — Z12.31 ENCOUNTER FOR SCREENING MAMMOGRAM FOR BREAST CANCER: ICD-10-CM

## 2025-06-06 DIAGNOSIS — M25.50 GENERALIZED JOINT PAIN: Primary | ICD-10-CM

## 2025-06-06 PROCEDURE — 77063 BREAST TOMOSYNTHESIS BI: CPT

## 2025-06-06 PROCEDURE — 99213 OFFICE O/P EST LOW 20 MIN: CPT | Performed by: FAMILY MEDICINE

## 2025-06-06 PROCEDURE — 1123F ACP DISCUSS/DSCN MKR DOCD: CPT | Performed by: FAMILY MEDICINE

## 2025-06-06 PROCEDURE — 1159F MED LIST DOCD IN RCRD: CPT | Performed by: FAMILY MEDICINE

## 2025-06-06 PROCEDURE — 3078F DIAST BP <80 MM HG: CPT | Performed by: FAMILY MEDICINE

## 2025-06-06 PROCEDURE — 1160F RVW MEDS BY RX/DR IN RCRD: CPT | Performed by: FAMILY MEDICINE

## 2025-06-06 PROCEDURE — 3075F SYST BP GE 130 - 139MM HG: CPT | Performed by: FAMILY MEDICINE

## 2025-06-06 RX ORDER — PREDNISONE 20 MG/1
40 TABLET ORAL DAILY
Qty: 10 TABLET | Refills: 0 | Status: SHIPPED | OUTPATIENT
Start: 2025-06-06 | End: 2025-06-11

## 2025-06-06 NOTE — PROGRESS NOTES
Chief Complaint   Patient presents with    Generalized Body Aches     C/o body aches, itchy and painful throat and headaches for 1 month now    Back Pain       Subjective:       Tressa Garcia is a 79 y.o. female having above symptoms for a month.  Has general aches especially chest wall area and itching especially of the left ear.  No new soap, food or detergent.  Denies any fever or chills.    Current Outpatient Medications   Medication Sig Dispense Refill    predniSONE (DELTASONE) 20 MG tablet Take 2 tablets by mouth daily for 5 days 10 tablet 0    metFORMIN (GLUCOPHAGE-XR) 500 MG extended release tablet Take 1 tablet by mouth daily (with breakfast) 90 tablet 3    levothyroxine (SYNTHROID) 75 MCG tablet TAKE 1 TABLET DAILY 90 tablet 3    atorvastatin (LIPITOR) 10 MG tablet TAKE 1 TABLET NIGHTLY 90 tablet 3    carvedilol (COREG) 12.5 MG tablet Take 1 tablet by mouth 2 times daily 180 tablet 3    omeprazole (PRILOSEC) 40 MG delayed release capsule Take 1 capsule by mouth daily 30 capsule 5    Wheat Dextrin (BENEFIBER PO) PRN       No current facility-administered medications for this visit.      Allergies   Allergen Reactions    Macrobid [Nitrofurantoin] Rash    Penicillins Itching       Objective:   /76 (BP Site: Left Upper Arm, Patient Position: Sitting, BP Cuff Size: Medium Adult)   Pulse 79   Temp 98.1 °F (36.7 °C) (Oral)   Wt 61.2 kg (135 lb)   SpO2 99%   BMI 29.21 kg/m²   Alert oriented, NAD, ambulatory  HEENT: unremarkable, TMs clear and no drainage  Chest/Lungs: clear to ausculation, no wheezing/rales, has point tenderness on the right sternal area  Heart: RR, normal S1S2, no murmur  Back: good range of motion, (+) paralumbar tenderness, SLR (-), trigger points in the paracervical and trapezium.  Extremities: good ROM, good pulses, no edema.  Neurologic: grossly normal, DTR 2+ bilateral      Assessment/Plan:   1. Generalized joint pain  With muscle aches and generalized pruritus.

## 2025-06-09 ENCOUNTER — RESULTS FOLLOW-UP (OUTPATIENT)
Dept: PRIMARY CARE CLINIC | Age: 80
End: 2025-06-09

## 2025-06-19 NOTE — PROGRESS NOTES
2019 12:00 AM (Final)  Indications: Palpitation.    Summary  Normal myocardial perfusion study.  Normal LV size and systolic function.    ECG Findings  Normal response to lexiscan .    Symptoms  Patient developed nausea , likely related to lexiscan.  Symptoms resolved with aminophylline.  Denies chest pain or discomfort.    Complications  Procedure complication was none.      ISCHEMIC EVALUATION / CATH RESULTS  Cath:   No results found for this or any previous visit.     Calcium score   No results found for this or any previous visit.    Coronary CTA   No results found for this or any previous visit.    CARDIAC RHYTHM ASSESSMENT:  Holter/Event monitor  24  Dx : Palpitations  Unchanged from prior monitors.  A few short runs of NSVT, which aren't new. Continue beta blocker     10/4/23  Very short runs of VT, continue current dose of atenolol               EP studies / cardioversions   No results found for this or any previous visit.    Outside/Care everywhere records Reviewed  Labs Reviewed  Prior Imaging, ekg, cath, echo reviewed when available  Medications reviewed  Old Notes reviewed  ASSESSMENT AND PLAN     Encounter Diagnoses   Name Primary?    Resistant hypertension Yes    Palpitations     NSVT (nonsustained ventricular tachycardia) (HCC)     PAT (paroxysmal atrial tachycardia)     Mixed hyperlipidemia    Summary of Assessment and Plan     Palpitations/VT/AT - continue current meds.  Will get 14 day monitor.  HTN -  stable on current meds.  Continue carvedilol at current dose    25    NO Medication changes today   Continue risk factor modifications   Call for any new or worsening symptoms.     All new cardiac testing and lab results personally reviewed by me during this office visit and discussed with patient.    Patient counseled on lifestyle modification, diet, and exercise.    Follow up: 6 months     Testin day holter      Lorenzo Gómez MD  Cardiologist [facility]    [place]

## 2025-06-20 ENCOUNTER — OFFICE VISIT (OUTPATIENT)
Dept: CARDIOLOGY CLINIC | Age: 80
End: 2025-06-20
Payer: MEDICARE

## 2025-06-20 ENCOUNTER — ANCILLARY PROCEDURE (OUTPATIENT)
Dept: CARDIOLOGY CLINIC | Age: 80
End: 2025-06-20

## 2025-06-20 VITALS
OXYGEN SATURATION: 98 % | HEIGHT: 59 IN | BODY MASS INDEX: 26.93 KG/M2 | WEIGHT: 133.6 LBS | SYSTOLIC BLOOD PRESSURE: 136 MMHG | HEART RATE: 71 BPM | DIASTOLIC BLOOD PRESSURE: 68 MMHG

## 2025-06-20 DIAGNOSIS — I47.29 NSVT (NONSUSTAINED VENTRICULAR TACHYCARDIA) (HCC): ICD-10-CM

## 2025-06-20 DIAGNOSIS — R00.2 PALPITATIONS: ICD-10-CM

## 2025-06-20 DIAGNOSIS — E78.2 MIXED HYPERLIPIDEMIA: ICD-10-CM

## 2025-06-20 DIAGNOSIS — I1A.0 RESISTANT HYPERTENSION: Primary | ICD-10-CM

## 2025-06-20 DIAGNOSIS — I47.19 PAT (PAROXYSMAL ATRIAL TACHYCARDIA): ICD-10-CM

## 2025-06-20 LAB — ECHO BSA: 1.58 M2

## 2025-06-20 PROCEDURE — 99214 OFFICE O/P EST MOD 30 MIN: CPT | Performed by: INTERNAL MEDICINE

## 2025-06-20 PROCEDURE — 3078F DIAST BP <80 MM HG: CPT | Performed by: INTERNAL MEDICINE

## 2025-06-20 PROCEDURE — 3075F SYST BP GE 130 - 139MM HG: CPT | Performed by: INTERNAL MEDICINE

## 2025-06-20 PROCEDURE — G2211 COMPLEX E/M VISIT ADD ON: HCPCS | Performed by: INTERNAL MEDICINE

## 2025-06-20 PROCEDURE — 93000 ELECTROCARDIOGRAM COMPLETE: CPT | Performed by: INTERNAL MEDICINE

## 2025-06-20 PROCEDURE — 1159F MED LIST DOCD IN RCRD: CPT | Performed by: INTERNAL MEDICINE

## 2025-06-20 PROCEDURE — 1123F ACP DISCUSS/DSCN MKR DOCD: CPT | Performed by: INTERNAL MEDICINE

## 2025-06-20 RX ORDER — CARVEDILOL 12.5 MG/1
12.5 TABLET ORAL 2 TIMES DAILY
Qty: 180 TABLET | Refills: 3 | Status: SHIPPED | OUTPATIENT
Start: 2025-06-20

## 2025-06-20 NOTE — NURSING NOTE
14 day monitor was placed on patient.      Type of monitor: holter  S/N ID:[id number]   Date placed on patient:06/20/2025  Date to return:07/04/2025  MA Initials:ALLI

## 2025-06-20 NOTE — PATIENT INSTRUCTIONS
Follow up with Dr Gómez in 6 months     14 day heart monitor     Call for any questions or concerns.

## 2025-06-30 ENCOUNTER — OFFICE VISIT (OUTPATIENT)
Dept: CARDIOLOGY CLINIC | Age: 80
End: 2025-06-30
Payer: MEDICARE

## 2025-06-30 ENCOUNTER — TELEPHONE (OUTPATIENT)
Dept: PRIMARY CARE CLINIC | Age: 80
End: 2025-06-30

## 2025-06-30 VITALS
OXYGEN SATURATION: 97 % | HEIGHT: 59 IN | HEART RATE: 74 BPM | WEIGHT: 132.8 LBS | SYSTOLIC BLOOD PRESSURE: 160 MMHG | DIASTOLIC BLOOD PRESSURE: 62 MMHG | BODY MASS INDEX: 26.77 KG/M2

## 2025-06-30 DIAGNOSIS — I10 PRIMARY HYPERTENSION: Primary | ICD-10-CM

## 2025-06-30 DIAGNOSIS — I47.19 PAT (PAROXYSMAL ATRIAL TACHYCARDIA): ICD-10-CM

## 2025-06-30 DIAGNOSIS — E78.2 MIXED HYPERLIPIDEMIA: ICD-10-CM

## 2025-06-30 PROCEDURE — 3078F DIAST BP <80 MM HG: CPT | Performed by: NURSE PRACTITIONER

## 2025-06-30 PROCEDURE — 3077F SYST BP >= 140 MM HG: CPT | Performed by: NURSE PRACTITIONER

## 2025-06-30 PROCEDURE — 1159F MED LIST DOCD IN RCRD: CPT | Performed by: NURSE PRACTITIONER

## 2025-06-30 PROCEDURE — 1123F ACP DISCUSS/DSCN MKR DOCD: CPT | Performed by: NURSE PRACTITIONER

## 2025-06-30 PROCEDURE — 99214 OFFICE O/P EST MOD 30 MIN: CPT | Performed by: NURSE PRACTITIONER

## 2025-06-30 RX ORDER — AMLODIPINE BESYLATE 5 MG/1
5 TABLET ORAL DAILY
Qty: 90 TABLET | Refills: 1 | Status: SHIPPED | OUTPATIENT
Start: 2025-06-30

## 2025-06-30 RX ORDER — CARVEDILOL 25 MG/1
25 TABLET ORAL 2 TIMES DAILY
Qty: 200 TABLET | Refills: 3 | Status: SHIPPED | OUTPATIENT
Start: 2025-06-30

## 2025-06-30 NOTE — TELEPHONE ENCOUNTER
Spoke with Blood pressure has been high 200's/110's for 2 days having leg pain, headache, chest discomfort with dizziness. Informed patient to go to ER for eval.

## 2025-06-30 NOTE — PROGRESS NOTES
[facility]     Outpatient Follow Up Note  Acute visit    Tressa Garcia is 79 y.o. female who presents today with a history of HTN, hyperlipidemia, PAT on event monitor '23 and CM      CHIEF COMPLAINT / HPI:  Follow Up secondary to hypertension. She called this morning with /116 persistently elevated for two days. She has leg pain / HA / chest discomfort and dizziness.    Subjective:   Her chest is heavy and it feels like a pulling sensation in her throat-neck. It comes/goes.  Her forehead feels heavy and her head/scalp feels painful. She has an infx in both ears ongoing for nearly 2 years    There is no SOB/HENDRICKSON. The patient denies orthopnea/PND. The patient does not have swelling. The patients weight is stable. The patient is experiencing palpitations described as always feeling nervous.     These symptoms show no change since the last OV.   With regard to medication therapy the patient has been compliant with prescribed regimen. They have tolerated therapy to date.     Past Medical History:   Diagnosis Date    Age-related osteoporosis without current pathological fracture 11/21/2013    DEXA scan T score showed lumbar spine -2.6, left femoral neck -1.1, right femoral neck -1.2.  Tried Fosamax but had GI side effects    Age-related osteoporosis without current pathological fracture 05/22/2020    DEXA scan T score showed lumbar spine -2.5, left femoral neck -1.1, right femoral neck -1.4.  Started Prolia 7/24/2020    Arthritis     all over    Chronic back pain don't remember    Colitis 03/05/2013    Diabetes mellitus (HCC) 05/24/2024    New onset, hemoglobin A1c 6.8%    Enteritis 03/05/2013    Exposure to hepatitis B 12/23/2021    Fibromyalgia     Gallstones     Had cholecystectomy    GERD (gastroesophageal reflux disease) 2021    this year    Heart murmur 02/07/2014    2D echo showed mild to moderate MR, TR, LAE, EF 65%    History of chickenpox     Hyperlipidemia     Hypertension

## 2025-06-30 NOTE — TELEPHONE ENCOUNTER
Patient called and said she checked her BP this morning and it was at 203/116 with no symptoms.   She said she feels fine, she does not feel weak, tired or has SOB.

## 2025-06-30 NOTE — PATIENT INSTRUCTIONS
Increase carvedilol to 25 mg twice a day for BP control    Begin amlodipine 5 mg daily     Appt in four weeks

## 2025-07-01 ENCOUNTER — TELEPHONE (OUTPATIENT)
Dept: PRIMARY CARE CLINIC | Age: 80
End: 2025-07-01

## 2025-07-01 NOTE — TELEPHONE ENCOUNTER
Pt call requesting a call back concerning her medication and bp. States that her appt with her specialist was told to follow up about bp with PCP.

## 2025-07-02 DIAGNOSIS — E11.9 DIABETES MELLITUS, NEW ONSET (HCC): ICD-10-CM

## 2025-07-02 RX ORDER — METFORMIN HYDROCHLORIDE 500 MG/1
500 TABLET, EXTENDED RELEASE ORAL
Qty: 90 TABLET | Refills: 3 | Status: SHIPPED | OUTPATIENT
Start: 2025-07-02

## 2025-07-03 ENCOUNTER — CLINICAL SUPPORT (OUTPATIENT)
Dept: CARDIOLOGY CLINIC | Age: 80
End: 2025-07-03

## 2025-07-03 VITALS — DIASTOLIC BLOOD PRESSURE: 60 MMHG | HEART RATE: 73 BPM | SYSTOLIC BLOOD PRESSURE: 110 MMHG | OXYGEN SATURATION: 97 %

## 2025-07-08 ENCOUNTER — OFFICE VISIT (OUTPATIENT)
Dept: PRIMARY CARE CLINIC | Age: 80
End: 2025-07-08
Payer: MEDICARE

## 2025-07-08 VITALS
SYSTOLIC BLOOD PRESSURE: 136 MMHG | HEIGHT: 58 IN | BODY MASS INDEX: 28.09 KG/M2 | DIASTOLIC BLOOD PRESSURE: 70 MMHG | WEIGHT: 133.8 LBS | HEART RATE: 72 BPM | OXYGEN SATURATION: 98 %

## 2025-07-08 DIAGNOSIS — R19.7 DIARRHEA, UNSPECIFIED TYPE: ICD-10-CM

## 2025-07-08 DIAGNOSIS — H53.8 BLURRED VISION: ICD-10-CM

## 2025-07-08 DIAGNOSIS — L29.9 PRURITUS: ICD-10-CM

## 2025-07-08 DIAGNOSIS — E11.9 DIABETES MELLITUS TYPE II, NON INSULIN DEPENDENT (HCC): Primary | ICD-10-CM

## 2025-07-08 DIAGNOSIS — R51.9 SCALP PAIN: ICD-10-CM

## 2025-07-08 PROCEDURE — 3044F HG A1C LEVEL LT 7.0%: CPT | Performed by: FAMILY MEDICINE

## 2025-07-08 PROCEDURE — 1123F ACP DISCUSS/DSCN MKR DOCD: CPT | Performed by: FAMILY MEDICINE

## 2025-07-08 PROCEDURE — 1159F MED LIST DOCD IN RCRD: CPT | Performed by: FAMILY MEDICINE

## 2025-07-08 PROCEDURE — 1160F RVW MEDS BY RX/DR IN RCRD: CPT | Performed by: FAMILY MEDICINE

## 2025-07-08 PROCEDURE — 3075F SYST BP GE 130 - 139MM HG: CPT | Performed by: FAMILY MEDICINE

## 2025-07-08 PROCEDURE — 99214 OFFICE O/P EST MOD 30 MIN: CPT | Performed by: FAMILY MEDICINE

## 2025-07-08 PROCEDURE — 3078F DIAST BP <80 MM HG: CPT | Performed by: FAMILY MEDICINE

## 2025-07-08 RX ORDER — BLOOD-GLUCOSE METER
1 KIT MISCELLANEOUS DAILY
Qty: 1 KIT | Refills: 0 | Status: SHIPPED | OUTPATIENT
Start: 2025-07-08

## 2025-07-08 RX ORDER — GLUCOSAMINE HCL/CHONDROITIN SU 500-400 MG
CAPSULE ORAL
Qty: 100 STRIP | Refills: 11 | Status: SHIPPED | OUTPATIENT
Start: 2025-07-08

## 2025-07-08 RX ORDER — AVOBENZONE, HOMOSALATE, OCTISALATE, OCTOCRYLENE 30; 40; 45; 26 MG/ML; MG/ML; MG/ML; MG/ML
1 CREAM TOPICAL DAILY
Qty: 100 EACH | Refills: 5 | Status: SHIPPED | OUTPATIENT
Start: 2025-07-08

## 2025-07-08 NOTE — PROGRESS NOTES
Chief Complaint   Patient presents with    Headache     Scalp pain comes and goes, ongoing 2 months  Associated with gum pain    Hypertension     BP concerns        Subjective:       Tressa Garcia is a 79 y.o. female here for off-and-on scalp pain with gum pain and associated ear pruritus.  Has generalized itching sometimes.  Lately has been having diarrhea described as watery but no melena or hematochezia.  Also noticed headache and blurred vision after a meal.  She is diabetic but under control on metformin.  She does not check her sugar.  Blood pressure has been fluctuating lately but her cardiologist made some changes.  Her hypertension is being managed by her cardiologist and a nephrologist.  So far blood pressures been stable since the dose adjustment of carvedilol and additional amlodipine.    6/30/2025: Cardiology follow-up due to fluctuating blood pressure.  Had blood pressure of 203/116.  Increased carvedilol to 25 twice daily from 12.5 mg twice a day, and started amlodipine 5 mg daily.    Current Outpatient Medications   Medication Sig Dispense Refill    glucose monitoring kit 1 kit by Does not apply route daily 1 kit 0    blood glucose monitor strips Check glucose 1x/day but vary the time 100 strip 11    Lancets MISC 1 each by Does not apply route daily 100 each 5    metFORMIN (GLUCOPHAGE-XR) 500 MG extended release tablet Take 1 tablet by mouth daily (with breakfast) 90 tablet 3    carvedilol (COREG) 25 MG tablet Take 1 tablet by mouth 2 times daily 200 tablet 3    amLODIPine (NORVASC) 5 MG tablet Take 1 tablet by mouth daily 90 tablet 1    levothyroxine (SYNTHROID) 75 MCG tablet TAKE 1 TABLET DAILY 90 tablet 3    atorvastatin (LIPITOR) 10 MG tablet TAKE 1 TABLET NIGHTLY 90 tablet 3    omeprazole (PRILOSEC) 40 MG delayed release capsule Take 1 capsule by mouth daily 30 capsule 5    Wheat Dextrin (BENEFIBER PO) PRN       No current facility-administered medications for this visit.

## 2025-07-10 LAB — ECHO BSA: 1.58 M2

## 2025-07-18 ENCOUNTER — OFFICE VISIT (OUTPATIENT)
Dept: INTERNAL MEDICINE CLINIC | Age: 80
End: 2025-07-18
Payer: MEDICARE

## 2025-07-18 VITALS
HEIGHT: 58 IN | BODY MASS INDEX: 27.62 KG/M2 | HEART RATE: 69 BPM | WEIGHT: 131.6 LBS | DIASTOLIC BLOOD PRESSURE: 76 MMHG | OXYGEN SATURATION: 97 % | SYSTOLIC BLOOD PRESSURE: 128 MMHG

## 2025-07-18 DIAGNOSIS — Z76.89 ENCOUNTER TO ESTABLISH CARE WITH NEW DOCTOR: Primary | ICD-10-CM

## 2025-07-18 DIAGNOSIS — K21.9 GASTROESOPHAGEAL REFLUX DISEASE WITHOUT ESOPHAGITIS: ICD-10-CM

## 2025-07-18 DIAGNOSIS — M79.7 FIBROMYALGIA: ICD-10-CM

## 2025-07-18 DIAGNOSIS — E03.9 HYPOTHYROIDISM IN ADULT: ICD-10-CM

## 2025-07-18 DIAGNOSIS — E11.9 DIABETES MELLITUS TYPE II, NON INSULIN DEPENDENT (HCC): ICD-10-CM

## 2025-07-18 DIAGNOSIS — I47.19 PAT (PAROXYSMAL ATRIAL TACHYCARDIA): ICD-10-CM

## 2025-07-18 DIAGNOSIS — I10 ESSENTIAL HYPERTENSION, BENIGN: ICD-10-CM

## 2025-07-18 DIAGNOSIS — E78.2 MIXED HYPERLIPIDEMIA: ICD-10-CM

## 2025-07-18 PROBLEM — K21.00 GASTROESOPHAGEAL REFLUX DISEASE WITH ESOPHAGITIS: Status: RESOLVED | Noted: 2025-07-18 | Resolved: 2025-07-18

## 2025-07-18 PROBLEM — I47.29 NSVT (NONSUSTAINED VENTRICULAR TACHYCARDIA) (HCC): Status: RESOLVED | Noted: 2025-05-02 | Resolved: 2025-07-18

## 2025-07-18 PROBLEM — R80.0 ISOLATED PROTEINURIA WITHOUT SPECIFIC MORPHOLOGIC LESION: Status: RESOLVED | Noted: 2024-08-07 | Resolved: 2025-07-18

## 2025-07-18 PROBLEM — K21.00 GASTROESOPHAGEAL REFLUX DISEASE WITH ESOPHAGITIS: Status: ACTIVE | Noted: 2025-07-18

## 2025-07-18 PROBLEM — M25.50 POLYARTHRALGIA: Status: RESOLVED | Noted: 2021-10-28 | Resolved: 2025-07-18

## 2025-07-18 PROCEDURE — 99214 OFFICE O/P EST MOD 30 MIN: CPT | Performed by: INTERNAL MEDICINE

## 2025-07-18 PROCEDURE — 3044F HG A1C LEVEL LT 7.0%: CPT | Performed by: INTERNAL MEDICINE

## 2025-07-18 PROCEDURE — 1123F ACP DISCUSS/DSCN MKR DOCD: CPT | Performed by: INTERNAL MEDICINE

## 2025-07-18 PROCEDURE — 3078F DIAST BP <80 MM HG: CPT | Performed by: INTERNAL MEDICINE

## 2025-07-18 PROCEDURE — 3074F SYST BP LT 130 MM HG: CPT | Performed by: INTERNAL MEDICINE

## 2025-07-18 PROCEDURE — 1159F MED LIST DOCD IN RCRD: CPT | Performed by: INTERNAL MEDICINE

## 2025-07-18 ASSESSMENT — ENCOUNTER SYMPTOMS
WHEEZING: 0
SORE THROAT: 0
SHORTNESS OF BREATH: 0
VOMITING: 0
COUGH: 0
CHEST TIGHTNESS: 0
COLOR CHANGE: 0
CONSTIPATION: 0
NAUSEA: 0
BACK PAIN: 0
ABDOMINAL PAIN: 0

## 2025-07-18 NOTE — ASSESSMENT & PLAN NOTE
Diet and exercise recommendations reinforced, last hemoglobin A1c from 2 months ago is at 6.2, will continue current regimen of metformin once a day, patient is up-to-date with ophthalmology exam, she does follow-up with nephrology and most recent urine testing negative for microalbuminuria.  Will continue same medication regimen for now and update labs in 6 months when she returns for her annual wellness

## 2025-07-18 NOTE — ASSESSMENT & PLAN NOTE
Blood pressure stable and well-controlled on current dose of carvedilol with amlodipine that was recently added by cardiology, continue same, reinforced recommendations to adhere to low-salt diet and active lifestyle

## 2025-07-18 NOTE — ASSESSMENT & PLAN NOTE
Patient reports she has both omeprazole and pantoprazole at home and asking if she can use both of them, advised patient although they are the same class she should not use them interchangeably, recommended she finish current supply of omeprazole first and then do the pantoprazole or the other way around if still needs to be on PPI therapy.  Counseled regarding maintaining antireflux diet and GERD lifestyle modification changes, can do a trial of holding PPI therapy if no relapse in symptoms then can discontinue

## 2025-07-18 NOTE — PROGRESS NOTES
ASSESSMENT/PLAN:  1. Encounter to establish care with new doctor  2. Diabetes mellitus type II, non insulin dependent (HCC)  Assessment & Plan:  Diet and exercise recommendations reinforced, last hemoglobin A1c from 2 months ago is at 6.2, will continue current regimen of metformin once a day, patient is up-to-date with ophthalmology exam, she does follow-up with nephrology and most recent urine testing negative for microalbuminuria.  Will continue same medication regimen for now and update labs in 6 months when she returns for her annual wellness   3. Essential hypertension, benign  Assessment & Plan:  Blood pressure stable and well-controlled on current dose of carvedilol with amlodipine that was recently added by cardiology, continue same, reinforced recommendations to adhere to low-salt diet and active lifestyle   4. Hypothyroidism in adult  Assessment & Plan:  Has been stable on current dose of levothyroxine, labs recently checked, continue same and update labs next visit   5. Fibromyalgia  6. Gastroesophageal reflux disease without esophagitis  Assessment & Plan:  Patient reports she has both omeprazole and pantoprazole at home and asking if she can use both of them, advised patient although they are the same class she should not use them interchangeably, recommended she finish current supply of omeprazole first and then do the pantoprazole or the other way around if still needs to be on PPI therapy.  Counseled regarding maintaining antireflux diet and GERD lifestyle modification changes, can do a trial of holding PPI therapy if no relapse in symptoms then can discontinue   7. Mixed hyperlipidemia  Assessment & Plan:  Doing well on atorvastatin, continue same and update labs next visit, diet and exercise recommendations reinforced   8. PAT (paroxysmal atrial tachycardia)  Assessment & Plan:  Carvedilol dose recently increased, patient turned back event monitor and was told results are negative, today's exam

## 2025-07-18 NOTE — ASSESSMENT & PLAN NOTE
Doing well on atorvastatin, continue same and update labs next visit, diet and exercise recommendations reinforced

## 2025-07-18 NOTE — ASSESSMENT & PLAN NOTE
Carvedilol dose recently increased, patient turned back event monitor and was told results are negative, today's exam with normal sinus rhythm, continue carvedilol and follow-up with cardiology as scheduled

## 2025-07-18 NOTE — ASSESSMENT & PLAN NOTE
Has been stable on current dose of levothyroxine, labs recently checked, continue same and update labs next visit

## 2025-07-22 DIAGNOSIS — E78.2 MIXED HYPERLIPIDEMIA: ICD-10-CM

## 2025-07-23 RX ORDER — ATORVASTATIN CALCIUM 10 MG/1
10 TABLET, FILM COATED ORAL NIGHTLY
Qty: 90 TABLET | Refills: 3 | Status: SHIPPED | OUTPATIENT
Start: 2025-07-23

## 2025-07-25 ENCOUNTER — OFFICE VISIT (OUTPATIENT)
Dept: CARDIOLOGY CLINIC | Age: 80
End: 2025-07-25
Payer: MEDICARE

## 2025-07-25 VITALS
DIASTOLIC BLOOD PRESSURE: 62 MMHG | OXYGEN SATURATION: 98 % | HEIGHT: 58 IN | HEART RATE: 65 BPM | SYSTOLIC BLOOD PRESSURE: 122 MMHG | BODY MASS INDEX: 27.9 KG/M2 | WEIGHT: 132.9 LBS

## 2025-07-25 DIAGNOSIS — I10 PRIMARY HYPERTENSION: Primary | ICD-10-CM

## 2025-07-25 DIAGNOSIS — E78.2 MIXED HYPERLIPIDEMIA: ICD-10-CM

## 2025-07-25 DIAGNOSIS — I47.19 PAT (PAROXYSMAL ATRIAL TACHYCARDIA): ICD-10-CM

## 2025-07-25 PROCEDURE — 1159F MED LIST DOCD IN RCRD: CPT | Performed by: NURSE PRACTITIONER

## 2025-07-25 PROCEDURE — 1123F ACP DISCUSS/DSCN MKR DOCD: CPT | Performed by: NURSE PRACTITIONER

## 2025-07-25 PROCEDURE — 3074F SYST BP LT 130 MM HG: CPT | Performed by: NURSE PRACTITIONER

## 2025-07-25 PROCEDURE — 3078F DIAST BP <80 MM HG: CPT | Performed by: NURSE PRACTITIONER

## 2025-07-25 PROCEDURE — 99214 OFFICE O/P EST MOD 30 MIN: CPT | Performed by: NURSE PRACTITIONER

## 2025-07-25 NOTE — PATIENT INSTRUCTIONS
Have your  monitor how often you snore. You may need a sleep study to see if you have sleep apnea causing you to feel tired    Keep appt with Dr. Gómez in Dec

## 2025-07-25 NOTE — PROGRESS NOTES
[facility]     Outpatient Follow Up Note  Acute visit    Tressa Garcia is 79 y.o. female who presents today with a history of HTN, hyperlipidemia, PAT on event monitor '23 and CM      CHIEF COMPLAINT / HPI:  Follow Up secondary to hypertension: increasing carvedilol and starting amlodipine  Her home BP runs ~ 112-118/ sometimes 130/ .    Subjective:     Her Lt leg hurts from her foot to her buttocks. It cramps and hurts when she walks. It started sometime this month.    She gets a pulling sensation in her throat-neck. It comes/goes. She has to see an ENT    She denies chest discomfort  There is no SOB/HENDRICKSON. The patient denies orthopnea/PND. The patient does not have swelling. The patients weight is stable. The patient is experiencing palpitations described as always feeling nervous.     These symptoms show no change since the last OV.   With regard to medication therapy the patient has been compliant with prescribed regimen. They have tolerated therapy to date.     Past Medical History:   Diagnosis Date    Age-related osteoporosis without current pathological fracture 11/21/2013    DEXA scan T score showed lumbar spine -2.6, left femoral neck -1.1, right femoral neck -1.2.  Tried Fosamax but had GI side effects    Age-related osteoporosis without current pathological fracture 05/22/2020    DEXA scan T score showed lumbar spine -2.5, left femoral neck -1.1, right femoral neck -1.4.  Started Prolia 7/24/2020    Arthritis     all over    Chronic back pain don't remember    Colitis 03/05/2013    Diabetes mellitus (HCC) 05/24/2024    New onset, hemoglobin A1c 6.8%    Enteritis 03/05/2013    Exposure to hepatitis B 12/23/2021    Fibromyalgia     Gallstones     Had cholecystectomy    GERD (gastroesophageal reflux disease) 2021    this year    Heart murmur 02/07/2014    2D echo showed mild to moderate MR, TR, LAE, EF 65%    History of chickenpox     Hyperlipidemia     Hypertension     Hypothyroidism

## 2025-07-29 ENCOUNTER — OFFICE VISIT (OUTPATIENT)
Dept: ENT CLINIC | Age: 80
End: 2025-07-29
Payer: MEDICARE

## 2025-07-29 VITALS
TEMPERATURE: 97.3 F | BODY MASS INDEX: 27.92 KG/M2 | HEIGHT: 58 IN | SYSTOLIC BLOOD PRESSURE: 128 MMHG | WEIGHT: 133 LBS | OXYGEN SATURATION: 97 % | HEART RATE: 65 BPM | DIASTOLIC BLOOD PRESSURE: 68 MMHG

## 2025-07-29 DIAGNOSIS — R42 DIZZINESS: ICD-10-CM

## 2025-07-29 DIAGNOSIS — H91.93 BILATERAL HEARING LOSS, UNSPECIFIED HEARING LOSS TYPE: ICD-10-CM

## 2025-07-29 DIAGNOSIS — R09.82 PND (POST-NASAL DRIP): ICD-10-CM

## 2025-07-29 DIAGNOSIS — G89.29 CHRONIC THROAT PAIN: ICD-10-CM

## 2025-07-29 DIAGNOSIS — R09.A2 GLOBUS PHARYNGEUS: Primary | ICD-10-CM

## 2025-07-29 DIAGNOSIS — R07.0 CHRONIC THROAT PAIN: ICD-10-CM

## 2025-07-29 DIAGNOSIS — H60.543 DERMATITIS OF BOTH EAR CANALS: ICD-10-CM

## 2025-07-29 DIAGNOSIS — J30.9 ALLERGIC RHINITIS, UNSPECIFIED SEASONALITY, UNSPECIFIED TRIGGER: ICD-10-CM

## 2025-07-29 PROCEDURE — 99214 OFFICE O/P EST MOD 30 MIN: CPT | Performed by: STUDENT IN AN ORGANIZED HEALTH CARE EDUCATION/TRAINING PROGRAM

## 2025-07-29 PROCEDURE — 1159F MED LIST DOCD IN RCRD: CPT | Performed by: STUDENT IN AN ORGANIZED HEALTH CARE EDUCATION/TRAINING PROGRAM

## 2025-07-29 PROCEDURE — 3078F DIAST BP <80 MM HG: CPT | Performed by: STUDENT IN AN ORGANIZED HEALTH CARE EDUCATION/TRAINING PROGRAM

## 2025-07-29 PROCEDURE — 1160F RVW MEDS BY RX/DR IN RCRD: CPT | Performed by: STUDENT IN AN ORGANIZED HEALTH CARE EDUCATION/TRAINING PROGRAM

## 2025-07-29 PROCEDURE — 3074F SYST BP LT 130 MM HG: CPT | Performed by: STUDENT IN AN ORGANIZED HEALTH CARE EDUCATION/TRAINING PROGRAM

## 2025-07-29 PROCEDURE — 31575 DIAGNOSTIC LARYNGOSCOPY: CPT | Performed by: STUDENT IN AN ORGANIZED HEALTH CARE EDUCATION/TRAINING PROGRAM

## 2025-07-29 PROCEDURE — 1123F ACP DISCUSS/DSCN MKR DOCD: CPT | Performed by: STUDENT IN AN ORGANIZED HEALTH CARE EDUCATION/TRAINING PROGRAM

## 2025-07-29 RX ORDER — FLUOCINOLONE ACETONIDE 0.11 MG/ML
3 OIL AURICULAR (OTIC) 2 TIMES DAILY PRN
Qty: 1 EACH | Refills: 2 | Status: SHIPPED | OUTPATIENT
Start: 2025-07-29

## 2025-07-29 RX ORDER — FLUTICASONE PROPIONATE 50 MCG
2 SPRAY, SUSPENSION (ML) NASAL DAILY
Qty: 48 G | Refills: 1 | Status: SHIPPED | OUTPATIENT
Start: 2025-07-29

## 2025-07-29 NOTE — PROGRESS NOTES
[facility]  DIVISION OF OTOLARYNGOLOGY- HEAD & NECK SURGERY  CLINIC FOLLOW-UP VISIT      Patient Name: Tressa Garcia  Medical Record Number:  [id number]  Primary Care Physician:  Dora Wilson MD    ChiefComplaint     Chief Complaint   Patient presents with    Ear Problem     Itchy ears, throat sore,        History of Present Illness     Tressa Garcia is an 79 y.o. female previously seen for chronic eczematous otitis externa both ears, not recurrent acute otitis media of left ear with rupture of tympanic membrane which had resolved, acute otitis externa of left ear.  Last seen by Dr. Spain on 9/25/2023.    Interval History:   For the past 1 month she has been having intermittent throat pain and feeling like something is stuck in her throat.  This throat pain comes and goes.  She was given antibiotics for other reasons including an ear infection which did not help.  Denies dysphagia.  No recent voice changes.  She does have a history of reflux and currently takes Protonix every morning.  She takes over-the-counter medications for allergies, was on nasal sprays in the past but not recently.  Her ears are frequently itchy, she has used Derm otic oil in the past which helped but has not used it recently.      She gets occasional lightheadedness with blurred vision.  Also with occasional pressure in her forehead.  Denies room spinning vertigo.  No known provoking factors.  She feels like she has gradual hearing loss.  Denies tinnitus.  No history of chronic ear infections or ear surgery.    Dizziness with blurred vision. Will get room spinning momentarily at time. No known provoking factors. Feels like she has gradual hearing loss. No tinnitus. No hx of chronic ear infections or ear surgery.       Past Medical History     Past Medical History:   Diagnosis Date    Age-related osteoporosis without current pathological fracture 11/21/2013    DEXA scan T score showed lumbar spine -2.6, left femoral

## 2025-08-02 DIAGNOSIS — E03.9 HYPOTHYROIDISM IN ADULT: ICD-10-CM

## 2025-08-04 RX ORDER — LEVOTHYROXINE SODIUM 75 MCG
75 TABLET ORAL DAILY
Qty: 90 TABLET | Refills: 3 | OUTPATIENT
Start: 2025-08-04

## 2025-08-13 ENCOUNTER — OFFICE VISIT (OUTPATIENT)
Dept: INTERNAL MEDICINE CLINIC | Age: 80
End: 2025-08-13

## 2025-08-13 VITALS
HEART RATE: 67 BPM | SYSTOLIC BLOOD PRESSURE: 126 MMHG | BODY MASS INDEX: 28.48 KG/M2 | OXYGEN SATURATION: 98 % | WEIGHT: 132 LBS | HEIGHT: 57 IN | DIASTOLIC BLOOD PRESSURE: 76 MMHG

## 2025-08-13 DIAGNOSIS — K21.9 GASTROESOPHAGEAL REFLUX DISEASE WITHOUT ESOPHAGITIS: ICD-10-CM

## 2025-08-13 DIAGNOSIS — M79.7 FIBROMYALGIA: ICD-10-CM

## 2025-08-13 DIAGNOSIS — I10 ESSENTIAL HYPERTENSION, BENIGN: Primary | ICD-10-CM

## 2025-08-13 DIAGNOSIS — R19.7 DIARRHEA, UNSPECIFIED TYPE: ICD-10-CM

## 2025-08-13 ASSESSMENT — ENCOUNTER SYMPTOMS
VOMITING: 0
CONSTIPATION: 0
SHORTNESS OF BREATH: 0
BACK PAIN: 0
WHEEZING: 0
ABDOMINAL PAIN: 0
DIARRHEA: 1
COUGH: 0
CHEST TIGHTNESS: 0
SORE THROAT: 0
NAUSEA: 0
COLOR CHANGE: 0

## (undated) DEVICE — GOWN AURORA NONREINF LG: Brand: MEDLINE INDUSTRIES, INC.

## (undated) DEVICE — FORCEPS BX L240CM WRK CHN 2.8MM STD CAP W/ NDL MIC MESH

## (undated) DEVICE — DILATOR ENDOSCP L180CM DIA6FR BLLN L8CM DIA54-60FR

## (undated) DEVICE — SOLUTION IRRIG 500ML STRL H2O NONPYROGENIC

## (undated) DEVICE — MOUTHPIECE ENDOSCP L CTRL OPN AND SIDE PORTS DISP

## (undated) DEVICE — AIR/WATER CLEANING ADAPTER FOR OLYMPUS® GI ENDOSCOPE: Brand: BULLDOG®

## (undated) DEVICE — BW-412T DISP COMBO CLEANING BRUSH: Brand: SINGLE USE COMBINATION CLEANING BRUSH

## (undated) DEVICE — SYRINGE INFL 60ML DISP ALLIANCE II

## (undated) DEVICE — ENDOSCOPIC KIT 6X3/16 FT COLON W/ 1.1 OZ 2 GWN W/O BRSH

## (undated) DEVICE — SINGLE USE AIR/WATER, SUCTION AND BIOPSY VALVES SET: Brand: ORCAPOD™